# Patient Record
Sex: FEMALE | Race: WHITE | NOT HISPANIC OR LATINO | Employment: OTHER | URBAN - METROPOLITAN AREA
[De-identification: names, ages, dates, MRNs, and addresses within clinical notes are randomized per-mention and may not be internally consistent; named-entity substitution may affect disease eponyms.]

---

## 2017-05-04 ENCOUNTER — OFFICE VISIT (OUTPATIENT)
Dept: OBSTETRICS AND GYNECOLOGY | Facility: CLINIC | Age: 42
End: 2017-05-04

## 2017-05-04 ENCOUNTER — PROCEDURE VISIT (OUTPATIENT)
Dept: OBSTETRICS AND GYNECOLOGY | Facility: CLINIC | Age: 42
End: 2017-05-04

## 2017-05-04 VITALS
BODY MASS INDEX: 39.69 KG/M2 | SYSTOLIC BLOOD PRESSURE: 122 MMHG | DIASTOLIC BLOOD PRESSURE: 80 MMHG | WEIGHT: 268 LBS | HEIGHT: 69 IN

## 2017-05-04 DIAGNOSIS — Z13.29 SCREENING FOR THYROID DISORDER: ICD-10-CM

## 2017-05-04 DIAGNOSIS — Z30.431 IUD CHECK UP: Primary | ICD-10-CM

## 2017-05-04 DIAGNOSIS — R63.5 WEIGHT GAIN: ICD-10-CM

## 2017-05-04 DIAGNOSIS — Z12.31 ENCOUNTER FOR SCREENING MAMMOGRAM FOR MALIGNANT NEOPLASM OF BREAST: ICD-10-CM

## 2017-05-04 DIAGNOSIS — Z01.419 GYNECOLOGIC EXAM NORMAL: Primary | ICD-10-CM

## 2017-05-04 DIAGNOSIS — Q51.3 BICORNUATE UTERUS: ICD-10-CM

## 2017-05-04 DIAGNOSIS — N92.1 MENOMETRORRHAGIA: ICD-10-CM

## 2017-05-04 PROBLEM — M79.89 LEG SWELLING: Status: ACTIVE | Noted: 2017-05-04

## 2017-05-04 PROBLEM — G40.909 SEIZURE DISORDER: Status: ACTIVE | Noted: 2017-05-04

## 2017-05-04 LAB
BASOPHILS # BLD AUTO: 0.03 10*3/MM3 (ref 0–0.2)
BASOPHILS NFR BLD AUTO: 0.5 % (ref 0–2)
BILIRUB BLD-MCNC: NEGATIVE MG/DL
CLARITY, POC: CLEAR
COLOR UR: YELLOW
EOSINOPHIL # BLD AUTO: 0.09 10*3/MM3 (ref 0.1–0.3)
EOSINOPHIL NFR BLD AUTO: 1.6 % (ref 0–4)
ERYTHROCYTE [DISTWIDTH] IN BLOOD BY AUTOMATED COUNT: 13.5 % (ref 11.5–14.5)
GLUCOSE UR STRIP-MCNC: NEGATIVE MG/DL
HCT VFR BLD AUTO: 43.1 % (ref 37–47)
HGB BLD-MCNC: 14.8 G/DL (ref 12–16)
IMM GRANULOCYTES # BLD: 0.01 10*3/MM3 (ref 0–0.03)
IMM GRANULOCYTES NFR BLD: 0.2 % (ref 0–0.5)
KETONES UR QL: NEGATIVE
LEUKOCYTE EST, POC: NEGATIVE
LYMPHOCYTES # BLD AUTO: 2.03 10*3/MM3 (ref 0.6–4.8)
LYMPHOCYTES NFR BLD AUTO: 36.6 % (ref 20–45)
MCH RBC QN AUTO: 30.2 PG (ref 27–31)
MCHC RBC AUTO-ENTMCNC: 34.3 G/DL (ref 31–37)
MCV RBC AUTO: 88 FL (ref 81–99)
MONOCYTES # BLD AUTO: 0.29 10*3/MM3 (ref 0–1)
MONOCYTES NFR BLD AUTO: 5.2 % (ref 3–8)
NEUTROPHILS # BLD AUTO: 3.09 10*3/MM3 (ref 1.5–8.3)
NEUTROPHILS NFR BLD AUTO: 55.9 % (ref 45–70)
NITRITE UR-MCNC: NEGATIVE MG/ML
NRBC BLD AUTO-RTO: 0 /100 WBC (ref 0–0)
PH UR: 5 [PH] (ref 5–8)
PLATELET # BLD AUTO: 273 10*3/MM3 (ref 140–500)
PROT UR STRIP-MCNC: NEGATIVE MG/DL
RBC # BLD AUTO: 4.9 10*6/MM3 (ref 4.2–5.4)
RBC # UR STRIP: NEGATIVE /UL
SP GR UR: 1.03 (ref 1–1.03)
TSH SERPL DL<=0.005 MIU/L-ACNC: 1.3 MIU/ML (ref 0.27–4.2)
UROBILINOGEN UR QL: NORMAL
WBC # BLD AUTO: 5.54 10*3/MM3 (ref 4.8–10.8)

## 2017-05-04 PROCEDURE — G0101 CA SCREEN;PELVIC/BREAST EXAM: HCPCS | Performed by: OBSTETRICS & GYNECOLOGY

## 2017-05-04 PROCEDURE — 76830 TRANSVAGINAL US NON-OB: CPT | Performed by: OBSTETRICS & GYNECOLOGY

## 2017-05-04 PROCEDURE — 81002 URINALYSIS NONAUTO W/O SCOPE: CPT | Performed by: OBSTETRICS & GYNECOLOGY

## 2017-05-04 RX ORDER — POTASSIUM CHLORIDE 750 MG/1
10 TABLET, FILM COATED, EXTENDED RELEASE ORAL 2 TIMES DAILY
COMMUNITY

## 2017-05-04 RX ORDER — SPIRONOLACTONE 25 MG/1
25 TABLET ORAL DAILY
COMMUNITY
End: 2019-03-14

## 2017-05-04 RX ORDER — PHENYTOIN SODIUM 100 MG/1
400 CAPSULE, EXTENDED RELEASE ORAL 2 TIMES DAILY
COMMUNITY

## 2017-05-04 RX ORDER — LORATADINE 10 MG/1
1 CAPSULE, LIQUID FILLED ORAL DAILY
COMMUNITY
End: 2019-12-20

## 2017-05-04 RX ORDER — FUROSEMIDE 40 MG/1
120 TABLET ORAL DAILY
COMMUNITY

## 2017-05-08 PROBLEM — Q51.3 BICORNUATE UTERUS: Status: ACTIVE | Noted: 2017-05-08

## 2017-05-08 PROBLEM — Z72.0 TOBACCO USER: Status: ACTIVE | Noted: 2017-05-08

## 2017-05-08 PROBLEM — R63.8 INCREASED BMI: Status: ACTIVE | Noted: 2017-05-08

## 2017-05-08 LAB
CYTOLOGIST CVX/VAG CYTO: NORMAL
CYTOLOGY CVX/VAG DOC THIN PREP: NORMAL
DX ICD CODE: NORMAL
HIV 1 & 2 AB SER-IMP: NORMAL
HPV I/H RISK 1 DNA CVX QL PROBE+SIG AMP: NEGATIVE
OTHER STN SPEC: NORMAL
PATH REPORT.FINAL DX SPEC: NORMAL
STAT OF ADQ CVX/VAG CYTO-IMP: NORMAL

## 2017-05-11 ENCOUNTER — TRANSCRIBE ORDERS (OUTPATIENT)
Dept: OBSTETRICS AND GYNECOLOGY | Facility: CLINIC | Age: 42
End: 2017-05-11

## 2017-05-11 DIAGNOSIS — Z12.31 ENCOUNTER FOR MAMMOGRAM TO ESTABLISH BASELINE MAMMOGRAM: ICD-10-CM

## 2017-05-11 DIAGNOSIS — Z13.9 SCREENING: Primary | ICD-10-CM

## 2017-05-18 ENCOUNTER — HOSPITAL ENCOUNTER (OUTPATIENT)
Dept: MAMMOGRAPHY | Facility: HOSPITAL | Age: 42
Discharge: HOME | End: 2017-05-18
Attending: OBSTETRICS & GYNECOLOGY | Admitting: OBSTETRICS & GYNECOLOGY

## 2017-05-18 ENCOUNTER — APPOINTMENT (OUTPATIENT)
Dept: MAMMOGRAPHY | Facility: HOSPITAL | Age: 42
End: 2017-05-18
Attending: OBSTETRICS & GYNECOLOGY

## 2017-05-18 DIAGNOSIS — Z12.31 ENCOUNTER FOR MAMMOGRAM TO ESTABLISH BASELINE MAMMOGRAM: ICD-10-CM

## 2017-05-18 DIAGNOSIS — Z13.9 SCREENING: ICD-10-CM

## 2017-05-18 PROCEDURE — G0202 SCR MAMMO BI INCL CAD: HCPCS

## 2017-05-18 PROCEDURE — 77063 BREAST TOMOSYNTHESIS BI: CPT

## 2017-08-03 ENCOUNTER — OFFICE VISIT (OUTPATIENT)
Dept: OBSTETRICS AND GYNECOLOGY | Facility: CLINIC | Age: 42
End: 2017-08-03

## 2017-08-03 VITALS
WEIGHT: 251 LBS | SYSTOLIC BLOOD PRESSURE: 122 MMHG | HEIGHT: 69 IN | DIASTOLIC BLOOD PRESSURE: 70 MMHG | BODY MASS INDEX: 37.18 KG/M2

## 2017-08-03 DIAGNOSIS — N92.1 MENOMETRORRHAGIA: Primary | ICD-10-CM

## 2017-08-03 DIAGNOSIS — Q51.3 BICORNUATE UTERUS: ICD-10-CM

## 2017-08-03 PROCEDURE — 99213 OFFICE O/P EST LOW 20 MIN: CPT | Performed by: OBSTETRICS & GYNECOLOGY

## 2017-08-03 RX ORDER — FLUTICASONE PROPIONATE 50 MCG
SPRAY, SUSPENSION (ML) NASAL
Refills: 0 | COMMUNITY
Start: 2017-07-14 | End: 2018-01-04

## 2017-08-03 RX ORDER — ALBUTEROL SULFATE 2.5 MG/3ML
SOLUTION RESPIRATORY (INHALATION)
Refills: 0 | COMMUNITY
Start: 2017-07-14 | End: 2018-01-04

## 2017-08-03 RX ORDER — ACETAMINOPHEN AND CODEINE PHOSPHATE 120; 12 MG/5ML; MG/5ML
1 SOLUTION ORAL DAILY
Qty: 28 TABLET | Refills: 3 | Status: SHIPPED | OUTPATIENT
Start: 2017-08-03 | End: 2017-11-15 | Stop reason: SDUPTHER

## 2017-08-03 RX ORDER — CETIRIZINE HYDROCHLORIDE 10 MG/1
TABLET ORAL
Refills: 0 | COMMUNITY
Start: 2017-07-14 | End: 2018-01-04

## 2017-08-03 RX ORDER — MONTELUKAST SODIUM 10 MG/1
TABLET ORAL
Refills: 0 | COMMUNITY
Start: 2017-07-14 | End: 2018-01-04

## 2017-08-03 NOTE — PROGRESS NOTES
"FU VISIT    Chief Complaint: menometrorrhagia      Kayli Calles is a 42 y.o. patient who presents complaining of weight loss and depressive symptoms since her mother  1 week ago. Has a Mirena IUD in place. Had a cycle in May, 3 cycles in , 2 cycles in July. Pt has a bicornuate uterus. This is a fu appt for menometrorrhagia. Pt had EMBx and placement of IUD to control symptoms but due to her bicornuate uterus, she has had continued with menometrorrhagia.  Chief Complaint   Patient presents with   • Follow-up             The following portions of the patient's history were reviewed and updated as appropriate: allergies, current medications and problem list.    Review of Systems   Genitourinary: Positive for menstrual problem. Negative for pelvic pain and vaginal bleeding.       /70  Ht 69\" (175.3 cm)  Wt 251 lb (114 kg)  LMP 07/10/2017  Breastfeeding? No  BMI 37.07 kg/m2    Physical Exam   Constitutional: She is oriented to person, place, and time. She appears well-developed and well-nourished. No distress.   Pt crying about the death of her mother   Neurological: She is alert and oriented to person, place, and time.   Skin: She is not diaphoretic.   Psychiatric: She has a normal mood and affect. Her behavior is normal. Judgment and thought content normal.   Vitals reviewed.        Assessment/Plan   Kayli was seen today for follow-up.    Diagnoses and all orders for this visit:    Menometrorrhagia    Bicornuate uterus    Other orders  -     norethindrone (MICRONOR) 0.35 MG tablet; Take 1 tablet by mouth Daily.    43yo with menometrorrhagia    1) Menometrorrhagia: Pt had a  Mirena IUD placed to control this issue. The IUD is due to come out in 2019.  Pt cannot take combined OCPs due to her tobacco smoking so only other options are Micronor or Nexplanon or hysterectomy. Pt has continued to have irregular heavy bleeding since April. The bleeding is most likely due to her bicornuate uterus with " IUD in the left horn. Pt's mother  last week and pt is mourning her mother's loss. Discussed starting Micronor this  before considering more long term options. Pt to fu in 3 months to evaluate symptom.  3) Bicornuate uterus: Mirena IUD in the left horn  4) Contraception: Pt is not sexually active. She is aware that the IUD is not a good contraception option since she has a bicornuate uterus.  5) Smoking Status: .5ppd  6) Seizure disorder  7) Social: Pt has always lived her mother. Her mother  last week unexpectedly. Pt is now living with her sister.             Return in about 3 months (around 11/3/2017).      Carmelita Palma,     8/3/2017  9:17 PM

## 2017-10-27 ENCOUNTER — TRANSCRIBE ORDERS (OUTPATIENT)
Dept: ADMINISTRATIVE | Facility: HOSPITAL | Age: 42
End: 2017-10-27

## 2017-10-27 DIAGNOSIS — E04.2 NONTOXIC MULTINODULAR GOITER: Primary | ICD-10-CM

## 2017-10-31 ENCOUNTER — TRANSCRIBE ORDERS (OUTPATIENT)
Dept: ADMINISTRATIVE | Facility: HOSPITAL | Age: 42
End: 2017-10-31

## 2017-11-02 ENCOUNTER — OFFICE VISIT (OUTPATIENT)
Dept: OBSTETRICS AND GYNECOLOGY | Facility: CLINIC | Age: 42
End: 2017-11-02

## 2017-11-02 ENCOUNTER — PREP FOR SURGERY (OUTPATIENT)
Dept: OTHER | Facility: HOSPITAL | Age: 42
End: 2017-11-02

## 2017-11-02 VITALS
BODY MASS INDEX: 37.74 KG/M2 | WEIGHT: 254.8 LBS | HEIGHT: 69 IN | SYSTOLIC BLOOD PRESSURE: 118 MMHG | DIASTOLIC BLOOD PRESSURE: 72 MMHG

## 2017-11-02 DIAGNOSIS — N92.1 MENOMETRORRHAGIA: Primary | ICD-10-CM

## 2017-11-02 DIAGNOSIS — Z13.9 SCREENING FOR CONDITION: ICD-10-CM

## 2017-11-02 DIAGNOSIS — Q51.3 BICORNUATE UTERUS: ICD-10-CM

## 2017-11-02 LAB
BILIRUB BLD-MCNC: NEGATIVE MG/DL
CLARITY, POC: CLEAR
COLOR UR: YELLOW
GLUCOSE UR STRIP-MCNC: NEGATIVE MG/DL
KETONES UR QL: NEGATIVE
LEUKOCYTE EST, POC: NEGATIVE
NITRITE UR-MCNC: NEGATIVE MG/ML
PH UR: 5 [PH] (ref 5–8)
PROT UR STRIP-MCNC: NEGATIVE MG/DL
RBC # UR STRIP: ABNORMAL /UL
SP GR UR: 1 (ref 1–1.03)
UROBILINOGEN UR QL: NORMAL

## 2017-11-02 PROCEDURE — 99214 OFFICE O/P EST MOD 30 MIN: CPT | Performed by: OBSTETRICS & GYNECOLOGY

## 2017-11-02 RX ORDER — ERGOCALCIFEROL 1.25 MG/1
50000 CAPSULE ORAL
Refills: 0 | COMMUNITY
Start: 2017-10-16

## 2017-11-02 RX ORDER — SODIUM CHLORIDE 9 MG/ML
40 INJECTION, SOLUTION INTRAVENOUS AS NEEDED
Status: CANCELLED | OUTPATIENT
Start: 2017-11-02

## 2017-11-02 RX ORDER — BUSPIRONE HYDROCHLORIDE 7.5 MG/1
TABLET ORAL
Refills: 0 | COMMUNITY
Start: 2017-08-08 | End: 2018-01-04

## 2017-11-02 RX ORDER — SODIUM CHLORIDE 0.9 % (FLUSH) 0.9 %
1-10 SYRINGE (ML) INJECTION AS NEEDED
Status: CANCELLED | OUTPATIENT
Start: 2017-11-02

## 2017-11-02 NOTE — PROGRESS NOTES
"PROBLEM VISIT    Chief Complaint: menometrorrhagia      Kayli Calles is a 42 y.o. patient who presents for follow up of menometrorrhagia. Pt has had a long standing issue with this problem. Pt has a bicornuate uterus. She was placed on Micronor and has not had any improvement with her bleeding. Getting her cycle 2-3 times per month since starting. Pt has a Mirena IUD in one of her horns. IUD placed prior to dx of bicornuate uterus. Endometrial ablation was also attempted without success prior to diagnosis.  Chief Complaint   Patient presents with   • Follow-up             The following portions of the patient's history were reviewed and updated as appropriate: allergies, current medications and problem list.    Review of Systems   Genitourinary: Positive for menstrual problem and vaginal bleeding. Negative for pelvic pain.       /72  Ht 69\" (175.3 cm)  Wt 254 lb 12.8 oz (116 kg)  LMP 11/01/2017 (Exact Date)  Breastfeeding? No  BMI 37.63 kg/m2    Physical Exam   Constitutional: She is oriented to person, place, and time. She appears well-developed and well-nourished. No distress.   Abdominal: Soft. She exhibits no distension. There is no tenderness.   Neurological: She is alert and oriented to person, place, and time.   Skin: She is not diaphoretic.   Psychiatric: She has a normal mood and affect. Her behavior is normal. Judgment and thought content normal. Cognition and memory are normal.   Vitals reviewed.        Assessment/Plan   Kayli was seen today for follow-up.    Diagnoses and all orders for this visit:    Menometrorrhagia    Screening for condition  -     POC Urinalysis Dipstick    Bicornuate uterus    41yo with bicornuate uterus and menometrorrhagia    1) Gyn HM: pap normal 5/2017    2) Contraception: Pt has never had sex    3) Family Planning: No plans for children    4) Menometrorrhagia: Pt has a hx of normal TSH and CBC in 5/2017. Pt has a history of a normal endometrial biopsy prior to " attempting an endometrial ablation in 1/2014. An IUD was ultimately placed and the pt did well until this year. A repeat US in 5/2017 diagnosed the bicornuate uterus with the IUD in the left horn. Pt is a smoker and not a candidate for combined OCPs. A trial of Micronor was attempted for 3 months but did not help her symptoms. Pt is ready to proceed with a TLH. Procedure discussed at length including pre/intra/post procedure. Risks reviewed including bleeding, infection, damage to bowel, bladder, ureters and risk of DVT. Will proceed with scheduling.    5) Tobacco smoker.              No Follow-up on file.      Carmelita Palma,     11/6/2017  8:39 PM

## 2017-11-19 RX ORDER — NORETHINDRONE 0.35 MG
KIT ORAL
Qty: 28 TABLET | Refills: 3 | Status: SHIPPED | OUTPATIENT
Start: 2017-11-19 | End: 2018-01-04

## 2018-01-03 ENCOUNTER — TELEPHONE (OUTPATIENT)
Dept: OBSTETRICS AND GYNECOLOGY | Facility: CLINIC | Age: 43
End: 2018-01-03

## 2018-01-03 NOTE — TELEPHONE ENCOUNTER
Consent order left out the word hysterectomy, per pat . Need to correct , her surgery is 01/17/18. thanks

## 2018-01-04 ENCOUNTER — APPOINTMENT (OUTPATIENT)
Dept: PREADMISSION TESTING | Facility: HOSPITAL | Age: 43
End: 2018-01-04

## 2018-01-04 VITALS
BODY MASS INDEX: 38.69 KG/M2 | HEART RATE: 76 BPM | RESPIRATION RATE: 16 BRPM | DIASTOLIC BLOOD PRESSURE: 64 MMHG | HEIGHT: 69 IN | OXYGEN SATURATION: 100 % | WEIGHT: 261.2 LBS | SYSTOLIC BLOOD PRESSURE: 112 MMHG

## 2018-01-04 DIAGNOSIS — N92.1 MENOMETRORRHAGIA: Primary | ICD-10-CM

## 2018-01-04 DIAGNOSIS — G40.909 SEIZURE DISORDER (HCC): ICD-10-CM

## 2018-01-04 DIAGNOSIS — M79.89 LEG SWELLING: ICD-10-CM

## 2018-01-04 DIAGNOSIS — Q51.3 BICORNUATE UTERUS: ICD-10-CM

## 2018-01-04 LAB
ABO GROUP BLD: NORMAL
ABO GROUP BLD: NORMAL
ANION GAP SERPL CALCULATED.3IONS-SCNC: 12.4 MMOL/L
BASOPHILS # BLD AUTO: 0.02 10*3/MM3 (ref 0–0.2)
BASOPHILS NFR BLD AUTO: 0.3 % (ref 0–2)
BLD GP AB SCN SERPL QL: NEGATIVE
BUN BLD-MCNC: 21 MG/DL (ref 6–20)
BUN/CREAT SERPL: 21 (ref 7–25)
CALCIUM SPEC-SCNC: 8.4 MG/DL (ref 8.6–10.5)
CHLORIDE SERPL-SCNC: 99 MMOL/L (ref 98–107)
CO2 SERPL-SCNC: 27.6 MMOL/L (ref 22–29)
CREAT BLD-MCNC: 1 MG/DL (ref 0.57–1)
DEPRECATED RDW RBC AUTO: 47.5 FL (ref 37–54)
EOSINOPHIL # BLD AUTO: 0.09 10*3/MM3 (ref 0.1–0.3)
EOSINOPHIL NFR BLD AUTO: 1.2 % (ref 0–4)
ERYTHROCYTE [DISTWIDTH] IN BLOOD BY AUTOMATED COUNT: 14.2 % (ref 11.5–14.5)
GFR SERPL CREATININE-BSD FRML MDRD: 60 ML/MIN/1.73
GLUCOSE BLD-MCNC: 125 MG/DL (ref 65–99)
HCT VFR BLD AUTO: 39.4 % (ref 37–47)
HGB BLD-MCNC: 13.6 G/DL (ref 12–16)
IMM GRANULOCYTES # BLD: 0.02 10*3/MM3 (ref 0–0.03)
IMM GRANULOCYTES NFR BLD: 0.3 % (ref 0–0.5)
LYMPHOCYTES # BLD AUTO: 2.17 10*3/MM3 (ref 0.6–4.8)
LYMPHOCYTES NFR BLD AUTO: 29.7 % (ref 20–45)
MCH RBC QN AUTO: 31.3 PG (ref 27–31)
MCHC RBC AUTO-ENTMCNC: 34.5 G/DL (ref 31–37)
MCV RBC AUTO: 90.8 FL (ref 81–99)
MONOCYTES # BLD AUTO: 0.26 10*3/MM3 (ref 0–1)
MONOCYTES NFR BLD AUTO: 3.6 % (ref 3–8)
NEUTROPHILS # BLD AUTO: 4.74 10*3/MM3 (ref 1.5–8.3)
NEUTROPHILS NFR BLD AUTO: 64.9 % (ref 45–70)
NRBC BLD MANUAL-RTO: 0 /100 WBC (ref 0–0)
PLATELET # BLD AUTO: 238 10*3/MM3 (ref 140–500)
PMV BLD AUTO: 10.6 FL (ref 7.4–10.4)
POTASSIUM BLD-SCNC: 3.7 MMOL/L (ref 3.5–5.2)
RBC # BLD AUTO: 4.34 10*6/MM3 (ref 4.2–5.4)
RH BLD: NEGATIVE
RH BLD: NEGATIVE
SODIUM BLD-SCNC: 139 MMOL/L (ref 136–145)
WBC NRBC COR # BLD: 7.3 10*3/MM3 (ref 4.8–10.8)

## 2018-01-04 PROCEDURE — 86901 BLOOD TYPING SEROLOGIC RH(D): CPT | Performed by: OBSTETRICS & GYNECOLOGY

## 2018-01-04 PROCEDURE — 93005 ELECTROCARDIOGRAM TRACING: CPT

## 2018-01-04 PROCEDURE — 86900 BLOOD TYPING SEROLOGIC ABO: CPT | Performed by: OBSTETRICS & GYNECOLOGY

## 2018-01-04 PROCEDURE — 80048 BASIC METABOLIC PNL TOTAL CA: CPT | Performed by: OBSTETRICS & GYNECOLOGY

## 2018-01-04 PROCEDURE — 86850 RBC ANTIBODY SCREEN: CPT | Performed by: OBSTETRICS & GYNECOLOGY

## 2018-01-04 PROCEDURE — 86901 BLOOD TYPING SEROLOGIC RH(D): CPT

## 2018-01-04 PROCEDURE — 36415 COLL VENOUS BLD VENIPUNCTURE: CPT

## 2018-01-04 PROCEDURE — 85025 COMPLETE CBC W/AUTO DIFF WBC: CPT | Performed by: OBSTETRICS & GYNECOLOGY

## 2018-01-04 PROCEDURE — 86900 BLOOD TYPING SEROLOGIC ABO: CPT

## 2018-01-04 PROCEDURE — 93010 ELECTROCARDIOGRAM REPORT: CPT | Performed by: INTERNAL MEDICINE

## 2018-01-04 RX ORDER — FLUTICASONE PROPIONATE 50 MCG
2 SPRAY, SUSPENSION (ML) NASAL DAILY
COMMUNITY

## 2018-01-04 RX ORDER — ALBUTEROL SULFATE 2.5 MG/3ML
2.5 SOLUTION RESPIRATORY (INHALATION) EVERY 4 HOURS PRN
COMMUNITY
End: 2019-03-14

## 2018-01-04 RX ORDER — CETIRIZINE HYDROCHLORIDE 10 MG/1
10 TABLET ORAL DAILY
COMMUNITY

## 2018-01-04 RX ORDER — BUSPIRONE HYDROCHLORIDE 7.5 MG/1
7.5 TABLET ORAL 2 TIMES DAILY
COMMUNITY
End: 2019-12-20

## 2018-01-04 RX ORDER — MONTELUKAST SODIUM 10 MG/1
10 TABLET ORAL DAILY
COMMUNITY

## 2018-01-04 NOTE — DISCHARGE INSTRUCTIONS
PRE-ADMISSION TESTING INSTRUCTIONS FOR ADULTS    No aspirin, advil, aleve, ibuprofen, naproxen, diet pills, decongestants, or herbal/vitamins for a week prior to surgery.    Use your albuterol nebulizer the morning of surgery.  Take your buspar, singulair, and Dilantin the morning of surgery.    General Instructions:    • Do not eat solid food after midnight the night before surgery.  No gum, mints, or hard candy after midnight the night before surgery.  • You may drink clear liquids the day of surgery up until 2 hours before your arrival time.  (Until 5:30 am)  • Clear liquids are liquids you can see through. Nothing RED in color.    Plain water    Sports drinks  Sodas     Gelatin (Jell-O)  Fruit juices without pulp such as white grape juice and apple juice  Popsicles that contain no fruit or yogurt  Tea or coffee (no cream or milk added)    • It is beneficial for you to have a clear drink that contains carbohydrates just before you leave your house and before your fasting time begins.  We suggest a 20 ounce bottle of Gatorade or Powerade for non-diabetic patients or a 20 ounce bottle of G2 or Powerade Zero for diabetic patients.     • Patients who avoid smoking, chewing tobacco and alcohol for 4 weeks prior to surgery have a reduced risk of post-operative complications.  • Do not smoke, use chewing tobacco or drink alcohol the day of surgery    • Bring your C-PAP/ BI-PAP machine if you use one.  • Wear clean comfortable clothes and socks.  • Do not wear contact lenses, lotion, deodorant, or make-up.  Bring a case for your glasses if applicable.   • Bring crutches or walker if applicable.  • Leave all other valuables and jewelry at home.      Preventing a Surgical Site Infection:    • Shower the night before and on the morning of surgery using the chlorhexidine soap you were given.  Use a clean washcloth with the soap.  Place clean sheets on your bed after showering the night before surgery. Do not use the CHG soap  on your hair, face, or private areas. Wash your body gently for five (5) minutes. Do not scrub your skin too hard.  Dry with a clean towel and dress in clean clothing.    • Do not shave the surgical area for 10 days-2 weeks prior to surgery  because the razor can irritate skin and make it easier to develop an infection.    • Make sure you, your family, and all healthcare providers clean their hands with soap and water or an alcohol based hand  before caring for you or your wound.    • If at all possible, quit smoking as many days before surgery as you can.    Day of surgery:    Your surgeon’s office will advise you of your arrival time for the day of surgery.    Upon arrival, a Pre-op nurse and Anesthesia provider will review your health history, obtain vital signs, and answer questions you may have.  The only belongings needed at this time will be your home medications and if applicable your C-PAP/BI-PAP machine.  If you are staying overnight your family can leave the rest of your belongings in the car and bring them to your room later.  A Pre-op nurse will start an IV and you may receive medication in preparation for surgery, including something to help you relax.  Your family will be able to see you in the Pre-op area.  While you are in surgery your family should notify the waiting room  if they leave the waiting room area and provide a contact phone number.    IF you have any questions, you can call the Pre-Admission Department at [phone] or your surgeon's office.    Please be aware that surgery does come with discomfort.  We want to make every effort to control your discomfort so please discuss any uncontrolled symptoms with your nurse.   Your doctor will most likely have prescribed pain medications.      If you are going home after surgery, you will receive individualized written care instructions before being discharged.  A responsible adult (over the age of 18) must drive you to  and from the hospital on the day of your surgery and stay with you for 24 hours after anesthesia.    If you are staying overnight following surgery, you will be transported to your hospital room following the recovery period.  [facility] has all private rooms.    Deductibles and co-payments are collected on the day of service. Please be prepared to pay the required co-pay, deductible or deposit on the day of service as defined by your plan.    Total Laparoscopic Hysterectomy  A total laparoscopic hysterectomy is a minimally invasive surgery to remove your uterus and cervix. This surgery is performed by making several small cuts (incisions) in your abdomen. It can also be done with a thin, lighted tube (laparoscope) inserted into two small incisions in your lower abdomen. Your fallopian tubes and ovaries can be removed (bilateral salpingo-oophorectomy) during this surgery as well. Benefits of minimally invasive surgery include:  · Less pain.  · Less risk of blood loss.  · Less risk of infection.  · Quicker return to normal activities.  LET YOUR HEALTH CARE PROVIDER KNOW ABOUT:  · Any allergies you have.  · All medicines you are taking, including vitamins, herbs, eye drops, creams, and over-the-counter medicines.  · Previous problems you or members of your family have had with the use of anesthetics.  · Any blood disorders you have.  · Previous surgeries you have had.  · Medical conditions you have.  RISKS AND COMPLICATIONS   Generally, this is a safe procedure. However, as with any procedure, complications can occur. Possible complications include:  · Bleeding.  · Blood clots in the legs or lung.  · Infection.  · Injury to surrounding organs.  · Problems with anesthesia.  · Early menopause symptoms (hot flashes, night sweats, insomnia).  · Risk of conversion to an open abdominal incision.  BEFORE THE PROCEDURE  · Ask your health care provider about changing or stopping your regular medicines.  · Do not  take aspirin or blood thinners (anticoagulants) for 1 week before the surgery or as told by your health care provider.  · Do not eat or drink anything for 8 hours before the surgery or as told by your health care provider.  · Quit smoking if you smoke.  · Arrange for a ride home after surgery and for someone to help you at home during recovery.  PROCEDURE   · You will be given antibiotic medicine.  · An IV tube will be placed in your arm. You will be given medicine to make you sleep (general anesthetic).  · A villa catheter will be placed to drain your bladder during surgery. This is usually removed within 24 hours.  · A gas (carbon dioxide) will be used to inflate your abdomen. This will allow your surgeon to look inside your abdomen, perform your surgery, and treat any other problems found if necessary.  · Three or four small incisions (often less than 1/2 inch) will be made in your abdomen. One of these incisions will be made in the area of your belly button (navel). The laparoscope will be inserted into the incision. Your surgeon will look through the laparoscope while doing your procedure.  · Other surgical instruments will be inserted through the other incisions.  · Your uterus may be removed through your vagina or cut into small pieces and removed through the small incisions.  · Your incisions will be closed.  AFTER THE PROCEDURE  · The gas will be released from inside your abdomen.  · You will be taken to the recovery area where a nurse will watch and check your progress. Once you are awake, stable, and taking fluids well, without other problems, you will return to your room or be allowed to go home.  · There is usually minimal discomfort following the surgery because the incisions are so small.  · You will be given pain medicine while you are in the hospital and for when you go home.     This information is not intended to replace advice given to you by your health care provider. Make sure you discuss any  questions you have with your health care provider.     Document Released: 10/14/2008 Document Revised: 08/20/2014 Document Reviewed: 07/08/2014  [employer]® Patient Information ©2016 [employer].

## 2018-01-04 NOTE — PAT
Pt here for PAT visit.  Pre-op tests completed, chg soap given, and instructions reviewed.  Instructed clears until 5:30 am dos, voiced understanding.  Will need accu-check, HCG, and potassium dos.

## 2018-01-16 ENCOUNTER — ANESTHESIA EVENT (OUTPATIENT)
Dept: PERIOP | Facility: HOSPITAL | Age: 43
End: 2018-01-16

## 2018-01-17 ENCOUNTER — HOSPITAL ENCOUNTER (OUTPATIENT)
Facility: HOSPITAL | Age: 43
Discharge: HOME | End: 2018-01-18
Attending: OBSTETRICS & GYNECOLOGY | Admitting: OBSTETRICS & GYNECOLOGY

## 2018-01-17 ENCOUNTER — ANESTHESIA (OUTPATIENT)
Dept: PERIOP | Facility: HOSPITAL | Age: 43
End: 2018-01-17

## 2018-01-17 DIAGNOSIS — Z90.710 S/P HYSTERECTOMY: Primary | ICD-10-CM

## 2018-01-17 DIAGNOSIS — N92.1 MENOMETRORRHAGIA: ICD-10-CM

## 2018-01-17 LAB
ANION GAP SERPL CALCULATED.3IONS-SCNC: 8.5 MMOL/L
BUN BLD-MCNC: 18 MG/DL (ref 6–20)
BUN/CREAT SERPL: 18.4 (ref 7–25)
CALCIUM SPEC-SCNC: 8.4 MG/DL (ref 8.6–10.5)
CHLORIDE SERPL-SCNC: 100 MMOL/L (ref 98–107)
CO2 SERPL-SCNC: 28.5 MMOL/L (ref 22–29)
CREAT BLD-MCNC: 0.98 MG/DL (ref 0.57–1)
GFR SERPL CREATININE-BSD FRML MDRD: 62 ML/MIN/1.73
GLUCOSE BLD-MCNC: 87 MG/DL (ref 65–99)
HCG SERPL QL: NEGATIVE
POTASSIUM BLD-SCNC: 3.9 MMOL/L (ref 3.5–5.2)
SODIUM BLD-SCNC: 137 MMOL/L (ref 136–145)

## 2018-01-17 PROCEDURE — 94640 AIRWAY INHALATION TREATMENT: CPT

## 2018-01-17 PROCEDURE — 25010000002 MIDAZOLAM PER 1 MG: Performed by: ANESTHESIOLOGY

## 2018-01-17 PROCEDURE — 84703 CHORIONIC GONADOTROPIN ASSAY: CPT | Performed by: ANESTHESIOLOGY

## 2018-01-17 PROCEDURE — S0260 H&P FOR SURGERY: HCPCS | Performed by: OBSTETRICS & GYNECOLOGY

## 2018-01-17 PROCEDURE — 25010000002 HYDROMORPHONE PER 4 MG: Performed by: NURSE ANESTHETIST, CERTIFIED REGISTERED

## 2018-01-17 PROCEDURE — 25010000002 PROPOFOL 10 MG/ML EMULSION: Performed by: NURSE ANESTHETIST, CERTIFIED REGISTERED

## 2018-01-17 PROCEDURE — 58301 REMOVE INTRAUTERINE DEVICE: CPT | Performed by: OBSTETRICS & GYNECOLOGY

## 2018-01-17 PROCEDURE — 25010000002 FENTANYL CITRATE (PF) 100 MCG/2ML SOLUTION: Performed by: NURSE ANESTHETIST, CERTIFIED REGISTERED

## 2018-01-17 PROCEDURE — 25010000002 ONDANSETRON PER 1 MG: Performed by: ANESTHESIOLOGY

## 2018-01-17 PROCEDURE — 25010000002 SUCCINYLCHOLINE PER 20 MG: Performed by: NURSE ANESTHETIST, CERTIFIED REGISTERED

## 2018-01-17 PROCEDURE — 94799 UNLISTED PULMONARY SVC/PX: CPT

## 2018-01-17 PROCEDURE — 25010000002 KETOROLAC TROMETHAMINE PER 15 MG: Performed by: NURSE ANESTHETIST, CERTIFIED REGISTERED

## 2018-01-17 PROCEDURE — 25010000003 CEFAZOLIN PER 500 MG: Performed by: OBSTETRICS & GYNECOLOGY

## 2018-01-17 PROCEDURE — G0378 HOSPITAL OBSERVATION PER HR: HCPCS

## 2018-01-17 PROCEDURE — 25010000002 NEOSTIGMINE PER 0.5 MG: Performed by: NURSE ANESTHETIST, CERTIFIED REGISTERED

## 2018-01-17 PROCEDURE — 80048 BASIC METABOLIC PNL TOTAL CA: CPT | Performed by: ANESTHESIOLOGY

## 2018-01-17 PROCEDURE — 25010000003 HYDROMORPHONE PER 4 MG

## 2018-01-17 PROCEDURE — 25010000002 DEXAMETHASONE PER 1 MG: Performed by: ANESTHESIOLOGY

## 2018-01-17 PROCEDURE — 58571 TLH W/T/O 250 G OR LESS: CPT | Performed by: OBSTETRICS & GYNECOLOGY

## 2018-01-17 RX ORDER — ONDANSETRON 4 MG/1
4 TABLET, ORALLY DISINTEGRATING ORAL EVERY 6 HOURS PRN
Status: DISCONTINUED | OUTPATIENT
Start: 2018-01-17 | End: 2018-01-18 | Stop reason: HOSPADM

## 2018-01-17 RX ORDER — NALOXONE HCL 0.4 MG/ML
0.1 VIAL (ML) INJECTION
Status: DISCONTINUED | OUTPATIENT
Start: 2018-01-17 | End: 2018-01-18 | Stop reason: HOSPADM

## 2018-01-17 RX ORDER — ONDANSETRON 4 MG/1
4 TABLET, FILM COATED ORAL EVERY 6 HOURS PRN
Status: DISCONTINUED | OUTPATIENT
Start: 2018-01-17 | End: 2018-01-18 | Stop reason: HOSPADM

## 2018-01-17 RX ORDER — PROMETHAZINE HYDROCHLORIDE 25 MG/ML
6.25 INJECTION, SOLUTION INTRAMUSCULAR; INTRAVENOUS ONCE AS NEEDED
Status: DISCONTINUED | OUTPATIENT
Start: 2018-01-17 | End: 2018-01-17 | Stop reason: HOSPADM

## 2018-01-17 RX ORDER — SODIUM CHLORIDE 9 MG/ML
40 INJECTION, SOLUTION INTRAVENOUS AS NEEDED
Status: DISCONTINUED | OUTPATIENT
Start: 2018-01-17 | End: 2018-01-17 | Stop reason: HOSPADM

## 2018-01-17 RX ORDER — SODIUM CHLORIDE 0.9 % (FLUSH) 0.9 %
1-10 SYRINGE (ML) INJECTION AS NEEDED
Status: DISCONTINUED | OUTPATIENT
Start: 2018-01-17 | End: 2018-01-17 | Stop reason: HOSPADM

## 2018-01-17 RX ORDER — FAMOTIDINE 10 MG/ML
20 INJECTION, SOLUTION INTRAVENOUS
Status: DISCONTINUED | OUTPATIENT
Start: 2018-01-17 | End: 2018-01-17 | Stop reason: HOSPADM

## 2018-01-17 RX ORDER — POTASSIUM CHLORIDE 750 MG/1
10 TABLET, FILM COATED, EXTENDED RELEASE ORAL DAILY
Status: DISCONTINUED | OUTPATIENT
Start: 2018-01-17 | End: 2018-01-18 | Stop reason: HOSPADM

## 2018-01-17 RX ORDER — ROCURONIUM BROMIDE 10 MG/ML
INJECTION, SOLUTION INTRAVENOUS AS NEEDED
Status: DISCONTINUED | OUTPATIENT
Start: 2018-01-17 | End: 2018-01-17 | Stop reason: SURG

## 2018-01-17 RX ORDER — GLYCOPYRROLATE 0.2 MG/ML
0.2 INJECTION INTRAMUSCULAR; INTRAVENOUS
Status: DISCONTINUED | OUTPATIENT
Start: 2018-01-17 | End: 2018-01-17 | Stop reason: HOSPADM

## 2018-01-17 RX ORDER — MIDAZOLAM HYDROCHLORIDE 1 MG/ML
1 INJECTION INTRAMUSCULAR; INTRAVENOUS
Status: DISCONTINUED | OUTPATIENT
Start: 2018-01-17 | End: 2018-01-17 | Stop reason: HOSPADM

## 2018-01-17 RX ORDER — HYDROMORPHONE HCL 110MG/55ML
0.5 PATIENT CONTROLLED ANALGESIA SYRINGE INTRAVENOUS AS NEEDED
Status: DISCONTINUED | OUTPATIENT
Start: 2018-01-17 | End: 2018-01-17 | Stop reason: HOSPADM

## 2018-01-17 RX ORDER — ONDANSETRON 2 MG/ML
4 INJECTION INTRAMUSCULAR; INTRAVENOUS ONCE AS NEEDED
Status: DISCONTINUED | OUTPATIENT
Start: 2018-01-17 | End: 2018-01-17 | Stop reason: HOSPADM

## 2018-01-17 RX ORDER — LIDOCAINE HYDROCHLORIDE 20 MG/ML
INJECTION, SOLUTION INFILTRATION; PERINEURAL AS NEEDED
Status: DISCONTINUED | OUTPATIENT
Start: 2018-01-17 | End: 2018-01-17 | Stop reason: SURG

## 2018-01-17 RX ORDER — ONDANSETRON 2 MG/ML
4 INJECTION INTRAMUSCULAR; INTRAVENOUS ONCE AS NEEDED
Status: COMPLETED | OUTPATIENT
Start: 2018-01-17 | End: 2018-01-17

## 2018-01-17 RX ORDER — KETOROLAC TROMETHAMINE 30 MG/ML
INJECTION, SOLUTION INTRAMUSCULAR; INTRAVENOUS AS NEEDED
Status: DISCONTINUED | OUTPATIENT
Start: 2018-01-17 | End: 2018-01-17 | Stop reason: SURG

## 2018-01-17 RX ORDER — GLYCOPYRROLATE 0.2 MG/ML
INJECTION INTRAMUSCULAR; INTRAVENOUS AS NEEDED
Status: DISCONTINUED | OUTPATIENT
Start: 2018-01-17 | End: 2018-01-17 | Stop reason: SURG

## 2018-01-17 RX ORDER — MONTELUKAST SODIUM 10 MG/1
10 TABLET ORAL DAILY
Status: DISCONTINUED | OUTPATIENT
Start: 2018-01-17 | End: 2018-01-18 | Stop reason: HOSPADM

## 2018-01-17 RX ORDER — LIDOCAINE HYDROCHLORIDE 10 MG/ML
0.5 INJECTION, SOLUTION EPIDURAL; INFILTRATION; INTRACAUDAL; PERINEURAL ONCE AS NEEDED
Status: COMPLETED | OUTPATIENT
Start: 2018-01-17 | End: 2018-01-17

## 2018-01-17 RX ORDER — SPIRONOLACTONE 25 MG/1
25 TABLET ORAL DAILY
Status: DISCONTINUED | OUTPATIENT
Start: 2018-01-17 | End: 2018-01-18 | Stop reason: HOSPADM

## 2018-01-17 RX ORDER — KETAMINE HYDROCHLORIDE 100 MG/ML
INJECTION INTRAMUSCULAR; INTRAVENOUS AS NEEDED
Status: DISCONTINUED | OUTPATIENT
Start: 2018-01-17 | End: 2018-01-17 | Stop reason: SURG

## 2018-01-17 RX ORDER — DIPHENHYDRAMINE HYDROCHLORIDE 50 MG/ML
25 INJECTION INTRAMUSCULAR; INTRAVENOUS EVERY 6 HOURS PRN
Status: DISCONTINUED | OUTPATIENT
Start: 2018-01-17 | End: 2018-01-18 | Stop reason: HOSPADM

## 2018-01-17 RX ORDER — MIDAZOLAM HYDROCHLORIDE 1 MG/ML
2 INJECTION INTRAMUSCULAR; INTRAVENOUS
Status: DISCONTINUED | OUTPATIENT
Start: 2018-01-17 | End: 2018-01-17 | Stop reason: HOSPADM

## 2018-01-17 RX ORDER — SODIUM CHLORIDE, SODIUM LACTATE, POTASSIUM CHLORIDE, CALCIUM CHLORIDE 600; 310; 30; 20 MG/100ML; MG/100ML; MG/100ML; MG/100ML
9 INJECTION, SOLUTION INTRAVENOUS CONTINUOUS
Status: DISCONTINUED | OUTPATIENT
Start: 2018-01-17 | End: 2018-01-17

## 2018-01-17 RX ORDER — ONDANSETRON 2 MG/ML
4 INJECTION INTRAMUSCULAR; INTRAVENOUS EVERY 6 HOURS PRN
Status: DISCONTINUED | OUTPATIENT
Start: 2018-01-17 | End: 2018-01-18 | Stop reason: HOSPADM

## 2018-01-17 RX ORDER — PROPOFOL 10 MG/ML
VIAL (ML) INTRAVENOUS AS NEEDED
Status: DISCONTINUED | OUTPATIENT
Start: 2018-01-17 | End: 2018-01-17 | Stop reason: SURG

## 2018-01-17 RX ORDER — IPRATROPIUM BROMIDE AND ALBUTEROL SULFATE 2.5; .5 MG/3ML; MG/3ML
3 SOLUTION RESPIRATORY (INHALATION) ONCE
Status: COMPLETED | OUTPATIENT
Start: 2018-01-17 | End: 2018-01-17

## 2018-01-17 RX ORDER — PROMETHAZINE HYDROCHLORIDE 25 MG/1
25 SUPPOSITORY RECTAL ONCE AS NEEDED
Status: DISCONTINUED | OUTPATIENT
Start: 2018-01-17 | End: 2018-01-17 | Stop reason: HOSPADM

## 2018-01-17 RX ORDER — PROMETHAZINE HYDROCHLORIDE 25 MG/1
25 TABLET ORAL ONCE AS NEEDED
Status: DISCONTINUED | OUTPATIENT
Start: 2018-01-17 | End: 2018-01-17 | Stop reason: HOSPADM

## 2018-01-17 RX ORDER — FUROSEMIDE 40 MG/1
40 TABLET ORAL DAILY
Status: DISCONTINUED | OUTPATIENT
Start: 2018-01-17 | End: 2018-01-18 | Stop reason: HOSPADM

## 2018-01-17 RX ORDER — FLUTICASONE PROPIONATE 50 MCG
2 SPRAY, SUSPENSION (ML) NASAL DAILY
Status: DISCONTINUED | OUTPATIENT
Start: 2018-01-17 | End: 2018-01-18 | Stop reason: HOSPADM

## 2018-01-17 RX ORDER — SODIUM CHLORIDE, SODIUM LACTATE, POTASSIUM CHLORIDE, CALCIUM CHLORIDE 600; 310; 30; 20 MG/100ML; MG/100ML; MG/100ML; MG/100ML
100 INJECTION, SOLUTION INTRAVENOUS CONTINUOUS
Status: DISCONTINUED | OUTPATIENT
Start: 2018-01-17 | End: 2018-01-18

## 2018-01-17 RX ORDER — SCOLOPAMINE TRANSDERMAL SYSTEM 1 MG/1
1 PATCH, EXTENDED RELEASE TRANSDERMAL
Status: DISCONTINUED | OUTPATIENT
Start: 2018-01-17 | End: 2018-01-18 | Stop reason: HOSPADM

## 2018-01-17 RX ORDER — DEXAMETHASONE SODIUM PHOSPHATE 4 MG/ML
8 INJECTION, SOLUTION INTRA-ARTICULAR; INTRALESIONAL; INTRAMUSCULAR; INTRAVENOUS; SOFT TISSUE ONCE AS NEEDED
Status: COMPLETED | OUTPATIENT
Start: 2018-01-17 | End: 2018-01-17

## 2018-01-17 RX ORDER — PHENYTOIN SODIUM 100 MG/1
100 CAPSULE, EXTENDED RELEASE ORAL DAILY
Status: DISCONTINUED | OUTPATIENT
Start: 2018-01-18 | End: 2018-01-18

## 2018-01-17 RX ORDER — SUCCINYLCHOLINE CHLORIDE 20 MG/ML
INJECTION INTRAMUSCULAR; INTRAVENOUS AS NEEDED
Status: DISCONTINUED | OUTPATIENT
Start: 2018-01-17 | End: 2018-01-17 | Stop reason: SURG

## 2018-01-17 RX ORDER — FENTANYL CITRATE 50 UG/ML
INJECTION, SOLUTION INTRAMUSCULAR; INTRAVENOUS AS NEEDED
Status: DISCONTINUED | OUTPATIENT
Start: 2018-01-17 | End: 2018-01-17 | Stop reason: SURG

## 2018-01-17 RX ORDER — FENTANYL CITRATE 50 UG/ML
25 INJECTION, SOLUTION INTRAMUSCULAR; INTRAVENOUS AS NEEDED
Status: DISCONTINUED | OUTPATIENT
Start: 2018-01-17 | End: 2018-01-17 | Stop reason: HOSPADM

## 2018-01-17 RX ORDER — CETIRIZINE HYDROCHLORIDE 10 MG/1
10 TABLET ORAL DAILY
Status: DISCONTINUED | OUTPATIENT
Start: 2018-01-17 | End: 2018-01-18 | Stop reason: HOSPADM

## 2018-01-17 RX ORDER — BUSPIRONE HYDROCHLORIDE 15 MG/1
7.5 TABLET ORAL 2 TIMES DAILY
Status: DISCONTINUED | OUTPATIENT
Start: 2018-01-17 | End: 2018-01-18 | Stop reason: HOSPADM

## 2018-01-17 RX ORDER — MAGNESIUM HYDROXIDE 1200 MG/15ML
LIQUID ORAL AS NEEDED
Status: DISCONTINUED | OUTPATIENT
Start: 2018-01-17 | End: 2018-01-17 | Stop reason: HOSPADM

## 2018-01-17 RX ORDER — DOCUSATE SODIUM 100 MG/1
100 CAPSULE, LIQUID FILLED ORAL 2 TIMES DAILY PRN
Status: DISCONTINUED | OUTPATIENT
Start: 2018-01-17 | End: 2018-01-18 | Stop reason: HOSPADM

## 2018-01-17 RX ADMIN — FENTANYL CITRATE 50 MCG: 50 INJECTION, SOLUTION INTRAMUSCULAR; INTRAVENOUS at 10:42

## 2018-01-17 RX ADMIN — KETAMINE HYDROCHLORIDE 20 MG: 100 INJECTION INTRAMUSCULAR; INTRAVENOUS at 10:12

## 2018-01-17 RX ADMIN — BUSPIRONE HYDROCHLORIDE 7.5 MG: 15 TABLET ORAL at 21:44

## 2018-01-17 RX ADMIN — HYDROMORPHONE HYDROCHLORIDE 0.5 MG: 2 INJECTION INTRAMUSCULAR; INTRAVENOUS; SUBCUTANEOUS at 12:44

## 2018-01-17 RX ADMIN — KETAMINE HYDROCHLORIDE 20 MG: 100 INJECTION INTRAMUSCULAR; INTRAVENOUS at 10:33

## 2018-01-17 RX ADMIN — FENTANYL CITRATE 50 MCG: 50 INJECTION, SOLUTION INTRAMUSCULAR; INTRAVENOUS at 09:59

## 2018-01-17 RX ADMIN — PHENYTOIN SODIUM 100 MG: 100 CAPSULE, EXTENDED RELEASE ORAL at 20:10

## 2018-01-17 RX ADMIN — ROCURONIUM BROMIDE 10 MG: 10 INJECTION INTRAVENOUS at 10:59

## 2018-01-17 RX ADMIN — MIDAZOLAM HYDROCHLORIDE 1 MG: 1 INJECTION, SOLUTION INTRAMUSCULAR; INTRAVENOUS at 09:54

## 2018-01-17 RX ADMIN — MIDAZOLAM HYDROCHLORIDE 1 MG: 1 INJECTION, SOLUTION INTRAMUSCULAR; INTRAVENOUS at 09:22

## 2018-01-17 RX ADMIN — IPRATROPIUM BROMIDE AND ALBUTEROL SULFATE 3 ML: .5; 3 SOLUTION RESPIRATORY (INHALATION) at 08:03

## 2018-01-17 RX ADMIN — ONDANSETRON 4 MG: 2 INJECTION, SOLUTION INTRAMUSCULAR; INTRAVENOUS at 08:30

## 2018-01-17 RX ADMIN — ROCURONIUM BROMIDE 30 MG: 10 INJECTION INTRAVENOUS at 10:11

## 2018-01-17 RX ADMIN — PROPOFOL 100 MG: 10 INJECTION, EMULSION INTRAVENOUS at 10:03

## 2018-01-17 RX ADMIN — CEFAZOLIN SODIUM 2 G: 2 SOLUTION INTRAVENOUS at 09:58

## 2018-01-17 RX ADMIN — KETAMINE HYDROCHLORIDE 10 MG: 100 INJECTION INTRAMUSCULAR; INTRAVENOUS at 11:02

## 2018-01-17 RX ADMIN — FENTANYL CITRATE 50 MCG: 50 INJECTION, SOLUTION INTRAMUSCULAR; INTRAVENOUS at 11:38

## 2018-01-17 RX ADMIN — SODIUM CHLORIDE, POTASSIUM CHLORIDE, SODIUM LACTATE AND CALCIUM CHLORIDE 9 ML/HR: 600; 310; 30; 20 INJECTION, SOLUTION INTRAVENOUS at 08:04

## 2018-01-17 RX ADMIN — FENTANYL CITRATE 50 MCG: 50 INJECTION, SOLUTION INTRAMUSCULAR; INTRAVENOUS at 10:52

## 2018-01-17 RX ADMIN — KETAMINE HYDROCHLORIDE 10 MG: 100 INJECTION INTRAMUSCULAR; INTRAVENOUS at 11:13

## 2018-01-17 RX ADMIN — LIDOCAINE HYDROCHLORIDE 100 MG: 20 INJECTION, SOLUTION INFILTRATION; PERINEURAL at 10:03

## 2018-01-17 RX ADMIN — FLUTICASONE PROPIONATE 2 SPRAY: 50 SPRAY, METERED NASAL at 21:46

## 2018-01-17 RX ADMIN — ROCURONIUM BROMIDE 5 MG: 10 INJECTION INTRAVENOUS at 10:03

## 2018-01-17 RX ADMIN — HYDROMORPHONE HYDROCHLORIDE 0.5 MG: 2 INJECTION INTRAMUSCULAR; INTRAVENOUS; SUBCUTANEOUS at 12:58

## 2018-01-17 RX ADMIN — HYDROMORPHONE HYDROCHLORIDE: 10 INJECTION INTRAMUSCULAR; INTRAVENOUS; SUBCUTANEOUS at 15:00

## 2018-01-17 RX ADMIN — KETAMINE HYDROCHLORIDE 10 MG: 100 INJECTION INTRAMUSCULAR; INTRAVENOUS at 10:29

## 2018-01-17 RX ADMIN — KETOROLAC TROMETHAMINE 30 MG: 30 INJECTION INTRAMUSCULAR; INTRAVENOUS at 11:30

## 2018-01-17 RX ADMIN — NEOSTIGMINE METHYLSULFATE 3 MG: 1 INJECTION, SOLUTION INTRAMUSCULAR; INTRAVENOUS; SUBCUTANEOUS at 11:51

## 2018-01-17 RX ADMIN — HYDROMORPHONE HYDROCHLORIDE 0.5 MG: 2 INJECTION INTRAMUSCULAR; INTRAVENOUS; SUBCUTANEOUS at 13:09

## 2018-01-17 RX ADMIN — LIDOCAINE HYDROCHLORIDE 0.5 ML: 10 INJECTION, SOLUTION EPIDURAL; INFILTRATION; INTRACAUDAL; PERINEURAL at 08:04

## 2018-01-17 RX ADMIN — DEXAMETHASONE SODIUM PHOSPHATE 8 MG: 4 INJECTION, SOLUTION INTRA-ARTICULAR; INTRALESIONAL; INTRAMUSCULAR; INTRAVENOUS; SOFT TISSUE at 08:30

## 2018-01-17 RX ADMIN — ROCURONIUM BROMIDE 10 MG: 10 INJECTION INTRAVENOUS at 11:13

## 2018-01-17 RX ADMIN — GLYCOPYRROLATE 0.2 MG: 0.2 INJECTION INTRAMUSCULAR; INTRAVENOUS at 08:30

## 2018-01-17 RX ADMIN — SUCCINYLCHOLINE CHLORIDE 120 MG: 20 INJECTION, SOLUTION INTRAMUSCULAR; INTRAVENOUS at 10:03

## 2018-01-17 RX ADMIN — ROCURONIUM BROMIDE 15 MG: 10 INJECTION INTRAVENOUS at 10:32

## 2018-01-17 RX ADMIN — FAMOTIDINE 20 MG: 10 INJECTION, SOLUTION INTRAVENOUS at 08:30

## 2018-01-17 RX ADMIN — GLYCOPYRROLATE 0.4 MG: 0.2 INJECTION INTRAMUSCULAR; INTRAVENOUS at 11:51

## 2018-01-17 RX ADMIN — LIDOCAINE HYDROCHLORIDE 100 MG: 20 INJECTION, SOLUTION INFILTRATION; PERINEURAL at 11:56

## 2018-01-17 RX ADMIN — SCOPALAMINE 1 PATCH: 1 PATCH, EXTENDED RELEASE TRANSDERMAL at 08:57

## 2018-01-17 NOTE — ANESTHESIA PROCEDURE NOTES
Airway  Urgency: elective    Airway not difficult    General Information and Staff    Patient location during procedure: OR    Indications and Patient Condition  Indications for airway management: airway protection    Preoxygenated: yes  MILS maintained throughout  Mask difficulty assessment: 1 - vent by mask    Final Airway Details  Final airway type: endotracheal airway      Successful airway: ETT  Cuffed: yes   Successful intubation technique: direct laryngoscopy  Facilitating devices/methods: intubating stylet  Endotracheal tube insertion site: oral  Blade: Evans  Blade size: #3  ETT size: 7.0 mm  Cormack-Lehane Classification: grade IIa - partial view of glottis (W PRESSURE)  Placement verified by: chest auscultation and capnometry   Measured from: lips  ETT to lips (cm): 22  Number of attempts at approach: 1

## 2018-01-17 NOTE — PLAN OF CARE
Problem: Patient Care Overview (Adult)  Goal: Plan of Care Review  Outcome: Ongoing (interventions implemented as appropriate)   01/17/18 0742   Coping/Psychosocial Response Interventions   Plan Of Care Reviewed With patient   Patient Care Overview   Progress improving   Outcome Evaluation   Outcome Summary/Follow up Plan vss. no complaints of pain. will continue to monitor

## 2018-01-17 NOTE — H&P
"    Patient Care Team:  DMITRY Cowan as PCP - General (Family Medicine)    Chief complaint menometrorrhagia       HPI: 42 y.o. patient who presents for a TLH/BS to treat her menometrorrhagia. Pt has had a long standing issue with this problem. Pt has a bicornuate uterus. She was placed on Micronor and has not had any improvement with her bleeding. Getting her cycle 2-3 times per month since starting. Pt has a Mirena IUD in one of her horns. IUD placed prior to dx of bicornuate uterus. Endometrial ablation was also attempted without success prior to diagnosis. Pt is a tobacco smoker and cannot be on combined OCPs. Will proceed with definitive treatment with TLH/BS.      PMH:   Past Medical History:   Diagnosis Date   • Chronic bronchitis    • Low blood potassium    • Seizures     last one month ago, states has \"light\" ones   • Sinus congestion    • Swelling     furosemide         PSH:   Past Surgical History:   Procedure Laterality Date   • APPENDECTOMY     • FRACTURE SURGERY Right     hand       SoHx:   Social History     Social History   • Marital status: Single     Spouse name: N/A   • Number of children: N/A   • Years of education: N/A     Occupational History   • Not on file.     Social History Main Topics   • Smoking status: Current Every Day Smoker     Packs/day: 0.50     Years: 25.00     Types: Cigarettes   • Smokeless tobacco: Never Used   • Alcohol use No   • Drug use: No   • Sexual activity: No     Other Topics Concern   • Not on file     Social History Narrative       FHx:   Family History   Problem Relation Age of Onset   • Pulmonary embolism Mother    • Diabetes Father    • Diabetes Sister    • Diabetes Brother    • Cancer Maternal Aunt    • Dementia Maternal Grandmother    • Parkinsonism Maternal Grandfather        PGyn Hx: UTD    POBHx: G0    Allergies: Morphine and related and Sulfa antibiotics    Medications:   No current facility-administered medications on file prior to encounter.  "     Current Outpatient Prescriptions on File Prior to Encounter   Medication Sig Dispense Refill   • furosemide (LASIX) 40 MG tablet Take 40 mg by mouth 2 (Two) Times a Day.     • Loratadine (CLARITIN) 10 MG capsule Take 1 tablet by mouth Daily.     • phenytoin (DILANTIN) 100 MG ER capsule Take 100 mg by mouth 2 (Two) Times a Day.     • potassium chloride (K-DUR) 10 MEQ CR tablet Take 10 mEq by mouth 2 (Two) Times a Day.     • spironolactone (ALDACTONE) 25 MG tablet Take 25 mg by mouth Daily.     • vitamin D (ERGOCALCIFEROL) 00626 units capsule capsule Take 50,000 Units by mouth Every 7 (Seven) Days.  0   • levonorgestrel (MIRENA) 20 MCG/24HR IUD 1 each by Intrauterine route 1 (One) Time.               Vital Signs  Temp:  [97.7 °F (36.5 °C)] 97.7 °F (36.5 °C)  Heart Rate:  [58-62] 58  Resp:  [16] 16  BP: (96)/(67) 96/67    Physical Exam:  General: NAD  Heart:: RRR  Lungs: clear  Abdomen: soft, NT/ND, obese  Genitalia: deferred to OR  Extremities: no calf tenderness    Labs: Hgb 13.6. HCG neg      Assessment/Plan: 41yo with bicornuate uterus and menometrorrhagia refractory to medical therapy. Proceed with TLH/BS        I discussed the patients findings and my recommendations with patient and family.     Carmelita Palma DO  01/17/18  9:26 AM

## 2018-01-17 NOTE — OP NOTE
Subjective     Date of Service:  01/17/18  Time of Service:  12:00    Surgical Staff: Surgeon(s) and Role:     * Carmelita Palma, DO - Primary   Additional Staff: JENNIFER hurtado   Pre-operative diagnosis(es): Pre-Op Diagnosis Codes:     * Menometrorrhagia [N92.1]     Post-operative diagnosis(es): Post-Op Diagnosis Codes:     * Menometrorrhagia [N92.1]   Procedure(s): Procedure(s):  TOTAL LAPAROSCOPIC HYSTERECTOMY, bilateral salpingectomy, IUD removal, and bilateral  ovarian cystoscopy     Antibiotics: cefazolin (Ancef) ordered on call to OR     Anesthesia: Type: Choice  ASA:  II     Objective      Operative findings: Bicornuate uterus. Bilateral ovarian cysts. RLQ bowel adhesions   Specimens removed:   ID Type Source Tests Collected by Time Destination   A :  Tissue Uterus, Cervix, Bilateral Fallopian Tubes  TISSUE EXAM Carmelita Palma, DO 1/17/2018 1121    B : ovarian cyst wall Tissue Ovary, Right TISSUE EXAM Carmelita Palma, DO 1/17/2018 1142       Fluid Intake: 800mL   Output: Documented Output  Est. Blood Loss 75mL  Urine Output 700mL      I/O this shift:  In: 800 [I.V.:800]  Out: 775 [Urine:700; Blood:75]     Blood products used: No   Drains: Urethral Catheter 01/17/18 1031 16 10 10 (Active)   Daily Indications Monitoring of strict I &O 1/17/2018 12:22 PM       [REMOVED] Naso/Oral/Gastric Tube 01/17/18 1011 [facility] mouth (Removed)   Removed 01/17/18 1157      Implant Information: Nothing was implanted during the procedure   Complications: None apparent   Intraoperative consult(s):    Condition: stable   Disposition: to PACU and then admit to  medical - surgical floor         Assessment/Plan   42 y.o. patient who presents for a TLH/BS to treat her menometrorrhagia. Pt has had a long standing issue with this problem. Pt has a bicornuate uterus. She was placed on Micronor and has not had any improvement with her bleeding. Getting her cycle 2-3 times per month since starting. Pt has a Mirena IUD in one of her  horns. IUD placed prior to dx of bicornuate uterus. Endometrial ablation was also attempted without success prior to diagnosis. Pt is a tobacco smoker and cannot be on combined OCPs.  Patient declines a preop spinal for postop pain control.  The procedure was reviewed again with the patient and her family.  The postop course was reviewed.  All questions were answered.  The patient was taken back to the operating room and placed under general anesthesia with an endotracheal tube in place.  Patient was carefully placed in the dorsolithotomy position and prepped and draped in the normal sterile fashion.  A Leonard catheter was placed into the bladder and hung to gravity for the duration of the procedure.  Patient was given IV antibiotics for ID prophylaxis.  A speculum was placed into the vagina and the single tooth tenaculum was used to grasp the anterior lip the cervix.  The IUD strings were grasped with a ring forcep and the IUD was removed without difficulty.  Stay sutures were placed at the 3 and the 9 o'clock position.  The cervical os was gently dilated and a uterine manipulator was placed.  Attention was then turned to the abdomen where a small infra umbilical incision was made.  A 5 mm trocar was placed without difficulty and the abdomen was insufflated with CO2 gas.  Under direct visualization a 10 mm trocar was placed in the left lower quadrant.  A survey the patient's abdomen revealed bowel adhesions in the right lower quadrant from the patient's previous appendectomy.  The uterus was visualized as bicornuate.  There was a 3 cm simple appearing cyst and the right ovary and a 2 cm simple appearing cyst on the left ovary.  Using the Harmonic scalpel, lysis of adhesions was performed in the right lower quadrant.  Once the bowel was sufficiently away a 5 mm trocar was placed into the right lower quadrant without difficulty.  The patient's left fallopian tube was followed out to the fimbriated end and a  salpingectomy was performed.  The the left round ligament was grasped, coagulated, and transected with the Harmonic Scalpel.  The left tubo-ovarian vessels were grasped, coagulated, and transected as well.  The anterior leaf of the broad ligament was entered and a bladder flap was created. The uterine artery was skeletonized and bipolar coagulation was used to destroy the left uterine artery.  In the same fashion the patient's right fallopian tube was followed out to the fimbriated end and a salpingectomy was performed.  The right round ligament and tubo-ovarian vessels were grasped, coagulated, and transected with the Harmonic scalpel.  The right uterine artery was dissected and the right uterine artery was destroyed with bipolar coagulation. A circumferential incision was made  the uterus and cervix from the top of the vagina. The uterus was removed and sent to pathology and a bulb was placed into the vagina to maintain pneumoperitoneum.  The vaginal cuff was reapproximated with several 0 Vicryl sutures.  Inspection of the pedicles revealed hemostasis.  The abdomen was copiously irrigated and aspirated.  The ovarian cyst on the right and the left ovary were ruptured and a portion of the cyst wall was sent to pathology from the right ovarian cyst.  The fluid from both of the cysts was clear and the cysts appeared to be simple in nature.  Due to the adhesions in the abdomen as well as the history of the bicornuate uterus, the decision was made to proceed with cystoscopy to fully evaluate the ureters.  The Leonard catheter was removed and the bladder was backfilled with normal saline.  The laparoscope was then inserted into the urethra and bilateral ureteral peristalsis was noted.  The scope was removed and the Leonard was replaced.  All the trochars were then removed from the patient's abdomen and the CO2 gas was released.  The skin incisions were reapproximated with 4-0 Vicryl in a subcuticular fashion.   Patient tolerated procedure well.  There were no apparent complications and patient was in stable condition to postanesthesia recovery.

## 2018-01-17 NOTE — ANESTHESIA PREPROCEDURE EVALUATION
" Anesthesia Evaluation     Patient summary reviewed and Nursing notes reviewed   history of anesthetic complications (morphine and sulfa makes her sick, anesthesia makes her sick): PONV  NPO Solid Status: > 8 hours  NPO Liquid Status: > 8 hours     Airway   Mallampati: III  TM distance: >3 FB  Neck ROM: full  possible difficult intubation  Dental      Comment: POOR DENTITION/ BOTH UPPER INCISORS BROKEN OFF/ NUBS  MISSING OTHER UPPER TEETH      Pulmonary     breath sounds clear to auscultation  (+) a smoker (0.5 ppd) Current, decreased breath sounds,     ROS comment: History of chronic bronchitis-uses inhaler as needed.  Used last yesterday.  Cardiovascular   Exercise tolerance: good (4-7 METS)    Rhythm: regular  Rate: normal        Neuro/Psych  (+) seizures (dilantin, last \"bad one\" 1994), headaches (occ), dizziness/light headedness (frequent dizziness, \"comes and goes\"-unsure of cause, PCP aware per patient),       ROS Comment: History of meningitis as a baby, seizures due to that.    GI/Hepatic/Renal/Endo    (+) obesity,      Musculoskeletal         ROS comment: Reports has thrown hips out when working outside-2 years ago- no problems.  Abdominal    Substance History      OB/GYN          Other - negative ROS                                           Anesthesia Plan    ASA 2     general   (No ITN-patient refuses and allergy to morphine.)  Anesthetic plan and risks discussed with patient.  Use of blood products discussed with patient .     "

## 2018-01-17 NOTE — PLAN OF CARE
Problem: Perioperative Period (Adult)  Goal: Signs and Symptoms of Listed Potential Problems Will be Absent or Manageable (Perioperative Period)  Outcome: Ongoing (interventions implemented as appropriate)   01/17/18 0743   Perioperative Period   Problems Assessed (Perioperative Period) all   Problems Present (Perioperative Period) none

## 2018-01-17 NOTE — PLAN OF CARE
Problem: Patient Care Overview (Adult)  Goal: Adult Individualization and Mutuality  Outcome: Ongoing (interventions implemented as appropriate)   01/17/18 0743   Individualization   Patient Specific Preferences goes by Kayli   Patient Specific Interventions appropriate interventions implemented as needed

## 2018-01-17 NOTE — PLAN OF CARE
Problem: Patient Care Overview (Adult)  Goal: Plan of Care Review  Outcome: Ongoing (interventions implemented as appropriate)   01/17/18 1303   Coping/Psychosocial Response Interventions   Plan Of Care Reviewed With patient   Patient Care Overview   Progress improving   Outcome Evaluation   Outcome Summary/Follow up Plan vitals stable, pain score less than 6 upon dc from pacu

## 2018-01-17 NOTE — ANESTHESIA POSTPROCEDURE EVALUATION
Patient: Kayli Calles    Procedure Summary     Date Anesthesia Start Anesthesia Stop Room / Location    01/17/18 0958 1233 [facility] / [facility]       Procedure Diagnosis Surgeon Provider    TOTAL LAPAROSCOPIC HYSTERECTOMY, bilateral salpingectomy, IUD removal, and bilateral  ovarian cystoscopy (Bilateral Abdomen) Menometrorrhagia  (Menometrorrhagia [N92.1]) DO Pily Ya, CACHORRO          Anesthesia Type: general  Last vitals  BP   127/80 (01/17/18 1312)   Temp   97.7 °F (36.5 °C) (01/17/18 0736)   Pulse   60 (01/17/18 1312)   Resp   16 (01/17/18 1247)     SpO2   98 % (01/17/18 1312)     Post Anesthesia Care and Evaluation    Patient location during evaluation: PACU  Patient participation: complete - patient participated  Level of consciousness: responsive to verbal stimuli and sleepy but conscious  Pain management: adequate  Airway patency: patent  Anesthetic complications: No anesthetic complications (pt had some pharyngeal bleeding @ the end of the case that stopped b4 extubation/ none seen n RR)  PONV Status: none  Cardiovascular status: acceptable and hemodynamically stable  Respiratory status: acceptable and nasal cannula  Hydration status: acceptable    Comments: PACU RN TO REPORT TO FLOOR RE PREV PHARYNGEAL BLEEDING, TO OBSERVE. PT HAD ICECHIPS IN RR W NO FURTHER ISSUES

## 2018-01-17 NOTE — PLAN OF CARE
Problem: Patient Care Overview (Adult)  Goal: Adult Individualization and Mutuality  Outcome: Ongoing (interventions implemented as appropriate)   01/17/18 0743 01/17/18 1304   Individualization   Patient Specific Preferences goes by Kayli --    Patient Specific Goals --  pain score less than 6 when dc from pacu   Patient Specific Interventions --  pain medication administration   Mutuality/Individual Preferences   What Anxieties, Fears or Concerns Do You Have About Your Health or Care? --  pain after surgery

## 2018-01-17 NOTE — PLAN OF CARE
Problem: Perioperative Period (Adult)  Goal: Signs and Symptoms of Listed Potential Problems Will be Absent or Manageable (Perioperative Period)  Outcome: Ongoing (interventions implemented as appropriate)   01/17/18 1305   Perioperative Period   Problems Assessed (Perioperative Period) pain;physiologic stress response   Problems Present (Perioperative Period) pain;physiologic stress response

## 2018-01-18 VITALS
HEIGHT: 69 IN | OXYGEN SATURATION: 96 % | SYSTOLIC BLOOD PRESSURE: 103 MMHG | RESPIRATION RATE: 16 BRPM | DIASTOLIC BLOOD PRESSURE: 57 MMHG | HEART RATE: 62 BPM | TEMPERATURE: 98.2 F | WEIGHT: 263 LBS | BODY MASS INDEX: 38.95 KG/M2

## 2018-01-18 LAB
DEPRECATED RDW RBC AUTO: 48 FL (ref 37–54)
ERYTHROCYTE [DISTWIDTH] IN BLOOD BY AUTOMATED COUNT: 14.3 % (ref 11.5–14.5)
HCT VFR BLD AUTO: 34.7 % (ref 37–47)
HGB BLD-MCNC: 11.8 G/DL (ref 12–16)
MCH RBC QN AUTO: 31.3 PG (ref 27–31)
MCHC RBC AUTO-ENTMCNC: 34 G/DL (ref 31–37)
MCV RBC AUTO: 92 FL (ref 81–99)
PLATELET # BLD AUTO: 204 10*3/MM3 (ref 140–500)
PMV BLD AUTO: 10.3 FL (ref 7.4–10.4)
RBC # BLD AUTO: 3.77 10*6/MM3 (ref 4.2–5.4)
WBC NRBC COR # BLD: 7.57 10*3/MM3 (ref 4.8–10.8)

## 2018-01-18 PROCEDURE — 85027 COMPLETE CBC AUTOMATED: CPT | Performed by: OBSTETRICS & GYNECOLOGY

## 2018-01-18 PROCEDURE — 99024 POSTOP FOLLOW-UP VISIT: CPT | Performed by: NURSE PRACTITIONER

## 2018-01-18 PROCEDURE — G0378 HOSPITAL OBSERVATION PER HR: HCPCS

## 2018-01-18 PROCEDURE — 90686 IIV4 VACC NO PRSV 0.5 ML IM: CPT | Performed by: OBSTETRICS & GYNECOLOGY

## 2018-01-18 PROCEDURE — 25010000002 INFLUENZA VAC SPLIT QUAD 0.5 ML SUSPENSION PREFILLED SYRINGE: Performed by: OBSTETRICS & GYNECOLOGY

## 2018-01-18 PROCEDURE — G0008 ADMIN INFLUENZA VIRUS VAC: HCPCS | Performed by: OBSTETRICS & GYNECOLOGY

## 2018-01-18 RX ORDER — OXYCODONE HYDROCHLORIDE AND ACETAMINOPHEN 5; 325 MG/1; MG/1
1 TABLET ORAL EVERY 4 HOURS PRN
Status: DISCONTINUED | OUTPATIENT
Start: 2018-01-18 | End: 2018-01-18 | Stop reason: HOSPADM

## 2018-01-18 RX ORDER — IBUPROFEN 800 MG/1
800 TABLET ORAL EVERY 8 HOURS SCHEDULED
Qty: 30 TABLET | Refills: 0 | Status: SHIPPED | OUTPATIENT
Start: 2018-01-18

## 2018-01-18 RX ORDER — PSEUDOEPHEDRINE HCL 30 MG
100 TABLET ORAL 2 TIMES DAILY PRN
Qty: 30 CAPSULE | Refills: 0 | Status: SHIPPED | OUTPATIENT
Start: 2018-01-18 | End: 2019-03-14

## 2018-01-18 RX ORDER — IBUPROFEN 800 MG/1
TABLET ORAL
Status: COMPLETED
Start: 2018-01-18 | End: 2018-01-18

## 2018-01-18 RX ORDER — PHENYTOIN SODIUM 100 MG/1
400 CAPSULE, EXTENDED RELEASE ORAL EVERY 12 HOURS SCHEDULED
Status: DISCONTINUED | OUTPATIENT
Start: 2018-01-18 | End: 2018-01-18 | Stop reason: HOSPADM

## 2018-01-18 RX ORDER — POLYETHYLENE GLYCOL 3350 17 G/17G
17 POWDER, FOR SOLUTION ORAL DAILY
Qty: 15 PACKET | Refills: 0 | Status: SHIPPED | OUTPATIENT
Start: 2018-01-18 | End: 2019-03-14

## 2018-01-18 RX ORDER — OXYCODONE HYDROCHLORIDE AND ACETAMINOPHEN 5; 325 MG/1; MG/1
2 TABLET ORAL EVERY 4 HOURS PRN
Status: DISCONTINUED | OUTPATIENT
Start: 2018-01-18 | End: 2018-01-18 | Stop reason: HOSPADM

## 2018-01-18 RX ORDER — IBUPROFEN 800 MG/1
800 TABLET ORAL EVERY 8 HOURS SCHEDULED
Status: DISCONTINUED | OUTPATIENT
Start: 2018-01-18 | End: 2018-01-18 | Stop reason: HOSPADM

## 2018-01-18 RX ORDER — OXYCODONE HYDROCHLORIDE AND ACETAMINOPHEN 5; 325 MG/1; MG/1
2 TABLET ORAL EVERY 4 HOURS PRN
Qty: 30 TABLET | Refills: 0 | Status: SHIPPED | OUTPATIENT
Start: 2018-01-18 | End: 2018-01-28

## 2018-01-18 RX ADMIN — SPIRONOLACTONE 25 MG: 25 TABLET ORAL at 08:58

## 2018-01-18 RX ADMIN — MONTELUKAST SODIUM 10 MG: 10 TABLET, FILM COATED ORAL at 08:58

## 2018-01-18 RX ADMIN — SODIUM CHLORIDE, POTASSIUM CHLORIDE, SODIUM LACTATE AND CALCIUM CHLORIDE 100 ML/HR: 600; 310; 30; 20 INJECTION, SOLUTION INTRAVENOUS at 03:20

## 2018-01-18 RX ADMIN — POTASSIUM CHLORIDE 10 MEQ: 750 TABLET, FILM COATED, EXTENDED RELEASE ORAL at 08:58

## 2018-01-18 RX ADMIN — IBUPROFEN 800 MG: 800 TABLET ORAL at 13:12

## 2018-01-18 RX ADMIN — OXYCODONE HYDROCHLORIDE AND ACETAMINOPHEN 2 TABLET: 5; 325 TABLET ORAL at 10:09

## 2018-01-18 RX ADMIN — INFLUENZA VIRUS VACCINE 0.5 ML: 15; 15; 15; 15 SUSPENSION INTRAMUSCULAR at 13:54

## 2018-01-18 RX ADMIN — PHENYTOIN SODIUM 400 MG: 100 CAPSULE, EXTENDED RELEASE ORAL at 09:09

## 2018-01-18 RX ADMIN — BUSPIRONE HYDROCHLORIDE 7.5 MG: 15 TABLET ORAL at 08:58

## 2018-01-18 RX ADMIN — FUROSEMIDE 40 MG: 40 TABLET ORAL at 08:58

## 2018-01-18 RX ADMIN — CETIRIZINE HYDROCHLORIDE 10 MG: 10 TABLET, FILM COATED ORAL at 08:58

## 2018-01-18 RX ADMIN — FLUTICASONE PROPIONATE 2 SPRAY: 50 SPRAY, METERED NASAL at 09:08

## 2018-01-18 NOTE — NURSING NOTE
Case Management Discharge Note    Final Note: Patient discharged home to self care.    Discharge Placement     No information found             Discharge Codes: 01  Discharge to home

## 2018-01-18 NOTE — PROGRESS NOTES
Patient: Kayli Calles  Procedure(s):  TOTAL LAPAROSCOPIC HYSTERECTOMY, bilateral salpingectomy, IUD removal, and bilateral  ovarian cystoscopy  Anesthesia type: [unfilled]    Patient location: Labor and Delivery  Last vitals:   Vitals:    01/18/18 0719   BP: 103/57   Pulse: 62   Resp: 16   Temp: 98.2 °F (36.8 °C)   SpO2: 96%     Level of consciousness: awake, alert and oriented    Post-anesthesia pain: adequate analgesia  Airway patency: patent  Respiratory: unassisted  Cardiovascular: stable and blood pressure at baseline  Hydration: euvolemic    Anesthetic complications: no

## 2018-01-18 NOTE — NURSING NOTE
Continued Stay Note  MIESHA Melendez     Patient Name: Kayli Calles  MRN: [id number]  Today's Date: 1/18/2018    Admit Date: 1/17/2018          Discharge Plan       01/18/18 1310    Case Management/Social Work Plan    Plan Plan is home.    Additional Comments Patient in agreement with speaking to  with aunt at bedside.  She lives in a one level home with sister.  Her aunt has come from out of town to assist patient upon discharge.  She has been independent with ADLs.  She has a nebulizer machine which she owns.  She has no other medical equipment.  She has had home health many years ago but not presently.  She fills scripts at [facility] pharmacy in [place] and has no issues getting or paying for them.  She does not have a Living Will and per request left information.  Patient will contact Berta Kumari prior to discharge if she decides she would like to fill out Living Will form.    will continue to follow.               Discharge Codes     None            Terrie Ny RN

## 2018-01-18 NOTE — PLAN OF CARE
Problem: Patient Care Overview (Adult)  Goal: Plan of Care Review  Outcome: Ongoing (interventions implemented as appropriate)    Goal: Adult Individualization and Mutuality  Outcome: Ongoing (interventions implemented as appropriate)    Goal: Discharge Needs Assessment  Outcome: Ongoing (interventions implemented as appropriate)   01/18/18 0733   Discharge Needs Assessment   Concerns To Be Addressed no discharge needs identified       Problem: Perioperative Period (Adult)  Goal: Signs and Symptoms of Listed Potential Problems Will be Absent or Manageable (Perioperative Period)  Outcome: Ongoing (interventions implemented as appropriate)

## 2018-01-18 NOTE — PROGRESS NOTES
"Patient Care Team:  DMITRY Cowan as PCP - General (Family Medicine)        Chief Complaint:  POD #1 S/P TLH with jony salpingectomy and jony ovarian cystectomy     Subjective    Interval History and ROS:     Patient States she wants to get out of bed to move. She has not voided yet. She tolerated her breakfast. Her pain is fairly well controlled.   Patient Complaints: Tenderness in her abdomen.  Patient Denies:  Passing gas, vaginal bleeding, urination or uncontrolled pain   History taken from: patient chart      Objective    Vital Signs  Temp:  [97.6 °F (36.4 °C)-98.2 °F (36.8 °C)] 98.2 °F (36.8 °C)  Heart Rate:  [60-90] 62  Resp:  [13-22] 16  BP: ()/(49-96) 103/57    Flowsheet Rows         First Filed Value    Admission Height  175.3 cm (69\") Documented at 01/17/2018 0736    Admission Weight  119 kg (263 lb) Documented at 01/17/2018 0736          Physical Exam:  General Appearance: alert, pleasant, appears stated age and cooperative  Lungs: clear to auscultation, respirations regular, respirations even and respirations unlabored  Abdomen: normal bowel sounds, soft non-tender, no guarding and tenderness around incision. Dressing C/D/I x 3   Extremities: moves extremities well, no edema, no tenderness, Zelad's sign negative and SCDs in place   Skin: no bleeding, bruising or rash and color normal  Psych: normal    Results Review:     I reviewed the patient's new clinical results.    Lab Results (last 24 hours)     Procedure Component Value Units Date/Time    Tissue Pathology Exam - Tissue, Uterus, Cervix, Bilateral Fallopian Tubes [[id number]] Collected:  01/17/18 1121    Specimen:  Tissue from Uterus, Cervix, Bilateral Fallopian Tubes ; Tissue from Ovary, Right Updated:  01/17/18 1314    CBC (No Diff) [[id number]]  (Abnormal) Collected:  01/18/18 0626    Specimen:  Blood Updated:  01/18/18 0629     WBC 7.57 10*3/mm3      RBC 3.77 (L) 10*6/mm3      Hemoglobin 11.8 (L) g/dL      Hematocrit 34.7 (L) %  "     MCV 92.0 fL      MCH 31.3 (H) pg      MCHC 34.0 g/dL      RDW 14.3 %      RDW-SD 48.0 fl      MPV 10.3 fL      Platelets 204 10*3/mm3           Imaging Results (last 24 hours)     ** No results found for the last 24 hours. **          Xray not reviewed personally by physician.      ECG not reviewed personally by physician  ECG/EMG Results (most recent)     None          Medication Review:   I have reviewed the patient's current medication list    Current Facility-Administered Medications:   •  busPIRone (BUSPAR) tablet 7.5 mg, 7.5 mg, Oral, BID, Carmelita Palma DO, 7.5 mg at 01/18/18 0858  •  cetirizine (zyrTEC) tablet 10 mg, 10 mg, Oral, Daily, Carmelita Palma DO, 10 mg at 01/18/18 0858  •  diphenhydrAMINE (BENADRYL) injection 25 mg, 25 mg, Intravenous, Q6H PRN, Carmelita Palma DO  •  docusate sodium (COLACE) capsule 100 mg, 100 mg, Oral, BID PRN, Carmelita Palma DO  •  fluticasone (FLONASE) 50 MCG/ACT nasal spray 2 spray, 2 spray, Nasal, Daily, Carmelita Palma DO, 2 spray at 01/18/18 0908  •  furosemide (LASIX) tablet 40 mg, 40 mg, Oral, Daily, Carmelita Palma DO, 40 mg at 01/18/18 0858  •  ibuprofen (ADVIL,MOTRIN) tablet 800 mg, 800 mg, Oral, Q8H, Pratibha Rooney, DMITRY  •  Influenza Vac Subunit Quad (FLUCELVAX) injection 0.5 mL, 0.5 mL, Intramuscular, During Hospitalization, Marco Jackson MD  •  montelukast (SINGULAIR) tablet 10 mg, 10 mg, Oral, Daily, Carmelita Palma DO, 10 mg at 01/18/18 0858  •  naloxone (NARCAN) injection 0.1 mg, 0.1 mg, Intravenous, Q5 Min PRN, Carmelita Palma DO  •  ondansetron (ZOFRAN) tablet 4 mg, 4 mg, Oral, Q6H PRN **OR** ondansetron ODT (ZOFRAN-ODT) disintegrating tablet 4 mg, 4 mg, Oral, Q6H PRN **OR** ondansetron (ZOFRAN) injection 4 mg, 4 mg, Intravenous, Q6H PRN, Carmelita Palma DO  •  oxyCODONE-acetaminophen (PERCOCET) 5-325 MG per tablet 1 tablet, 1 tablet, Oral, Q4H PRN, DMITRY Saul  •  oxyCODONE-acetaminophen (PERCOCET) 5-325 MG per tablet 2  "tablet, 2 tablet, Oral, Q4H PRN, DMITRY Saul  •  phenytoin (DILANTIN) ER capsule 400 mg, 400 mg, Oral, Q12H, Marco Jackson MD  •  potassium chloride (K-DUR) CR tablet 10 mEq, 10 mEq, Oral, Daily, Carmelita Palma DO, 10 mEq at 01/18/18 0858  •  Scopolamine (TRANSDERM-SCOP) 1.5 MG/3DAYS patch 1 patch, 1 patch, Transdermal, Q72H, Shruthi Bland MD, 1 patch at 01/17/18 0857  •  spironolactone (ALDACTONE) tablet 25 mg, 25 mg, Oral, Daily, Carmelita Palma DO, 25 mg at 01/18/18 0858      Assessment/Plan     1) S/P TLH with bilateral salpingectomy and jony ovarian cystectomy- progressing well. Hgb 11.8    2) Post op care- Advance. D/C IV fluids. Stop PCA and convert to oral pain medication.  Pt reports she can not take Morphine because it \"made me sick.\" Enc ambulation.     3) Dispo- Plan home later today when pt has voided, + flatus, ambulating without difficulty, pain well controlled and tolerating diet.     Plan for disposition:possibly later today if progressing well     DMITRY Saul  01/18/18  10:01 AM      Time: More than 50% of time spent in counseling and coordination of care:  Total face-to-face/floor time 20 min.  Time spent in counseling 15 min. Counseling included the following topics: plan of care, pain medication, ambulation, etc.     "

## 2018-01-18 NOTE — DISCHARGE SUMMARY
Date of Discharge:  1/18/2018    Discharge Diagnosis:   MENOMETRORRHAGIA    Problem List:  Active Problems:    Menometrorrhagia    Bicornuate uterus      Presenting Problem/History of Present Illness  Menometrorrhagia [N92.1]  Menometrorrhagia [N92.1]        Hospital Course  Patient is a 42 y.o. female presented FOR [facility].      Procedures Performed  Procedure(s):  TOTAL LAPAROSCOPIC HYSTERECTOMY, bilateral salpingectomy, IUD removal, and bilateral  ovarian cystoscopy       Consults:   Consults     No orders found for last 30 day(s).          Pertinent Test Results:   Lab Results (last 24 hours)     Procedure Component Value Units Date/Time    CBC (No Diff) [[id number]]  (Abnormal) Collected:  01/18/18 0626    Specimen:  Blood Updated:  01/18/18 0629     WBC 7.57 10*3/mm3      RBC 3.77 (L) 10*6/mm3      Hemoglobin 11.8 (L) g/dL      Hematocrit 34.7 (L) %      MCV 92.0 fL      MCH 31.3 (H) pg      MCHC 34.0 g/dL      RDW 14.3 %      RDW-SD 48.0 fl      MPV 10.3 fL      Platelets 204 10*3/mm3           Condition on Discharge:  SATIS.  PT PASSED GAS AND VOIDED NORMALLY    Vital Signs  Temp:  [97.6 °F (36.4 °C)-98.2 °F (36.8 °C)] 98.2 °F (36.8 °C)  Heart Rate:  [60-90] 62  Resp:  [13-22] 16  BP: ()/(49-96) 103/57    Discharge Disposition  Home or Self Care    Discharge Medications   Kayli Calles   Home Medication Instructions SIERRA:[id number]    Printed on:01/18/18 1324   Medication Information                      albuterol (PROVENTIL) (2.5 MG/3ML) 0.083% nebulizer solution  Take 2.5 mg by nebulization Every 4 (Four) Hours As Needed for Wheezing (every 4-6 hours).             busPIRone (BUSPAR) 7.5 MG tablet  Take 7.5 mg by mouth 2 (Two) Times a Day.             cetirizine (zyrTEC) 10 MG tablet  Take 10 mg by mouth Daily.             docusate sodium 100 MG capsule  Take 100 mg by mouth 2 (Two) Times a Day As Needed for Constipation.             fluticasone (FLONASE) 50 MCG/ACT nasal spray  2 sprays into each  nostril Daily.             furosemide (LASIX) 40 MG tablet  Take 40 mg by mouth 2 (Two) Times a Day.             ibuprofen (ADVIL,MOTRIN) 800 MG tablet  Take 1 tablet by mouth Every 8 (Eight) Hours.             levonorgestrel (MIRENA) 20 MCG/24HR IUD  1 each by Intrauterine route 1 (One) Time.             Loratadine (CLARITIN) 10 MG capsule  Take 1 tablet by mouth Daily.             montelukast (SINGULAIR) 10 MG tablet  Take 10 mg by mouth Daily.             oxyCODONE-acetaminophen (PERCOCET) 5-325 MG per tablet  Take 2 tablets by mouth Every 4 (Four) Hours As Needed for Severe Pain  for up to 10 days.             phenytoin (DILANTIN) 100 MG ER capsule  Take 100 mg by mouth 2 (Two) Times a Day.             polyethylene glycol (MIRALAX) packet  Take 17 g by mouth Daily.             potassium chloride (K-DUR) 10 MEQ CR tablet  Take 10 mEq by mouth 2 (Two) Times a Day.             spironolactone (ALDACTONE) 25 MG tablet  Take 25 mg by mouth Daily.             vitamin D (ERGOCALCIFEROL) 38984 units capsule capsule  Take 50,000 Units by mouth Every 7 (Seven) Days.                 Discharge Diet   Diet Instructions     Diet: Regular; Thin       Discharge Diet:  Regular   Fluid Consistency:  Thin                 Activity at Discharge  Activity Instructions     Activity as Tolerated           Pelvic Rest                     Follow-up Appointments  No future appointments.  Additional Instructions for the Follow-ups that You Need to Schedule     Call MD With Problems / Concerns    As directed    Instructions: Instructions after dischage:  No driving for 2 weeks, call for temp>101, heavy vaginal bleeding, increasing lower abdominal pain or signs of incisional infection .  No intercourse, tampons or anything in the vagina for 6 weeks. No lifting over 10lbs. Bath or shower is fine. Take dressings off in 1-2 days, may leave open to air. Any problems call the office 24/7 @ [phone]   Order Comments:  Instructions: Instructions  after dischage:  No driving for 2 weeks, call for temp>101, heavy vaginal bleeding, increasing lower abdominal pain or signs of incisional infection .  No intercourse, tampons or anything in the vagina for 6 weeks. No lifting over 10lbs. Bath or shower is fine. Take dressings off in 1-2 days, may leave open to air. Any problems call the office 24/7 @ [phone]            Discharge Follow-up with Specified Provider: DR LOPES; 2 Weeks    As directed    To:  DR LOPES    Follow Up:  2 Weeks                       Marco Jackson MD  1:23 PM  1/18/2018

## 2018-01-18 NOTE — NURSING NOTE
Pt d/c home via w/c, family at side,pt a/o x 3, no c/o pain, no s/s of distress noted, iv removed earlier, pt tolerated without diff, d/c instructions given, pt verbalizes understanding

## 2018-01-18 NOTE — PLAN OF CARE
Problem: Patient Care Overview (Adult)  Goal: Discharge Needs Assessment  Outcome: Ongoing (interventions implemented as appropriate)   01/18/18 1309 01/18/18 1317   Discharge Needs Assessment   Concerns To Be Addressed denies needs/concerns at this time --    Readmission Within The Last 30 Days no previous admission in last 30 days --    Discharge Planning Comments --  Patient in agreement with speaking to  with aunt at bedside. She lives in a one level home with sister. Her aunt has come from out of town to assist patient upon discharge. She has been independent with ADLs. She has a nebulizer machine which she owns. She has no other medical equipment. She has had home health many years ago but not presently. She fills scripts at [facility] in [place] and has no issues getting or paying for them. She does not have a Living Will and per request left information. Patient will contact Berta Kumari prior to discharge if she decides she would like to fill out Living Will form.  will continue to follow.    Living Environment   Transportation Available car;family or friend will provide --    Self-Care   Equipment Currently Used at Home nebulizer --

## 2018-01-22 LAB
LAB AP CASE REPORT: NORMAL
Lab: NORMAL
PATH REPORT.FINAL DX SPEC: NORMAL

## 2018-01-23 ENCOUNTER — HOSPITAL ENCOUNTER (EMERGENCY)
Facility: HOSPITAL | Age: 43
Discharge: HOME | End: 2018-01-23
Attending: EMERGENCY MEDICINE | Admitting: EMERGENCY MEDICINE

## 2018-01-23 VITALS
OXYGEN SATURATION: 96 % | SYSTOLIC BLOOD PRESSURE: 107 MMHG | HEIGHT: 69 IN | TEMPERATURE: 97.9 F | DIASTOLIC BLOOD PRESSURE: 59 MMHG | WEIGHT: 240 LBS | BODY MASS INDEX: 35.55 KG/M2 | RESPIRATION RATE: 16 BRPM | HEART RATE: 59 BPM

## 2018-01-23 DIAGNOSIS — E86.9 FLUID VOLUME DEPLETION: Primary | ICD-10-CM

## 2018-01-23 LAB
ALBUMIN SERPL-MCNC: 4 G/DL (ref 3.5–5.2)
ALBUMIN/GLOB SERPL: 1.4 G/DL
ALP SERPL-CCNC: 120 U/L (ref 40–129)
ALT SERPL W P-5'-P-CCNC: 6 U/L (ref 5–33)
ANION GAP SERPL CALCULATED.3IONS-SCNC: 11.8 MMOL/L
AST SERPL-CCNC: 10 U/L (ref 5–32)
BASOPHILS # BLD AUTO: 0.03 10*3/MM3 (ref 0–0.2)
BASOPHILS NFR BLD AUTO: 0.4 % (ref 0–2)
BILIRUB SERPL-MCNC: <0.2 MG/DL (ref 0.2–1.2)
BUN BLD-MCNC: 21 MG/DL (ref 6–20)
BUN/CREAT SERPL: 17.5 (ref 7–25)
CALCIUM SPEC-SCNC: 8.6 MG/DL (ref 8.6–10.5)
CHLORIDE SERPL-SCNC: 98 MMOL/L (ref 98–107)
CO2 SERPL-SCNC: 29.2 MMOL/L (ref 22–29)
CREAT BLD-MCNC: 1.2 MG/DL (ref 0.57–1)
DEPRECATED RDW RBC AUTO: 45.5 FL (ref 37–54)
EOSINOPHIL # BLD AUTO: 0.07 10*3/MM3 (ref 0.1–0.3)
EOSINOPHIL NFR BLD AUTO: 1 % (ref 0–4)
ERYTHROCYTE [DISTWIDTH] IN BLOOD BY AUTOMATED COUNT: 13.8 % (ref 11.5–14.5)
GFR SERPL CREATININE-BSD FRML MDRD: 49 ML/MIN/1.73
GLOBULIN UR ELPH-MCNC: 2.9 GM/DL
GLUCOSE BLD-MCNC: 106 MG/DL (ref 65–99)
HCT VFR BLD AUTO: 41.8 % (ref 37–47)
HGB BLD-MCNC: 14.4 G/DL (ref 12–16)
IMM GRANULOCYTES # BLD: 0.01 10*3/MM3 (ref 0–0.03)
IMM GRANULOCYTES NFR BLD: 0.1 % (ref 0–0.5)
LIPASE SERPL-CCNC: 11 U/L (ref 13–60)
LYMPHOCYTES # BLD AUTO: 1.7 10*3/MM3 (ref 0.6–4.8)
LYMPHOCYTES NFR BLD AUTO: 23.9 % (ref 20–45)
MCH RBC QN AUTO: 31.2 PG (ref 27–31)
MCHC RBC AUTO-ENTMCNC: 34.4 G/DL (ref 31–37)
MCV RBC AUTO: 90.5 FL (ref 81–99)
MONOCYTES # BLD AUTO: 0.31 10*3/MM3 (ref 0–1)
MONOCYTES NFR BLD AUTO: 4.4 % (ref 3–8)
NEUTROPHILS # BLD AUTO: 4.98 10*3/MM3 (ref 1.5–8.3)
NEUTROPHILS NFR BLD AUTO: 70.2 % (ref 45–70)
NRBC BLD MANUAL-RTO: 0 /100 WBC (ref 0–0)
PHENYTOIN SERPL-MCNC: 10.2 MCG/ML (ref 10–20)
PLATELET # BLD AUTO: 250 10*3/MM3 (ref 140–500)
PMV BLD AUTO: 10.2 FL (ref 7.4–10.4)
POTASSIUM BLD-SCNC: 3.9 MMOL/L (ref 3.5–5.2)
PROT SERPL-MCNC: 6.9 G/DL (ref 6–8.5)
RBC # BLD AUTO: 4.62 10*6/MM3 (ref 4.2–5.4)
SODIUM BLD-SCNC: 139 MMOL/L (ref 136–145)
WBC NRBC COR # BLD: 7.1 10*3/MM3 (ref 4.8–10.8)

## 2018-01-23 PROCEDURE — 96374 THER/PROPH/DIAG INJ IV PUSH: CPT

## 2018-01-23 PROCEDURE — 99282 EMERGENCY DEPT VISIT SF MDM: CPT | Performed by: EMERGENCY MEDICINE

## 2018-01-23 PROCEDURE — 80053 COMPREHEN METABOLIC PANEL: CPT | Performed by: EMERGENCY MEDICINE

## 2018-01-23 PROCEDURE — 96361 HYDRATE IV INFUSION ADD-ON: CPT

## 2018-01-23 PROCEDURE — 99283 EMERGENCY DEPT VISIT LOW MDM: CPT

## 2018-01-23 PROCEDURE — 83690 ASSAY OF LIPASE: CPT | Performed by: EMERGENCY MEDICINE

## 2018-01-23 PROCEDURE — 80185 ASSAY OF PHENYTOIN TOTAL: CPT | Performed by: EMERGENCY MEDICINE

## 2018-01-23 PROCEDURE — 25010000002 ONDANSETRON PER 1 MG: Performed by: EMERGENCY MEDICINE

## 2018-01-23 PROCEDURE — 85025 COMPLETE CBC W/AUTO DIFF WBC: CPT | Performed by: EMERGENCY MEDICINE

## 2018-01-23 RX ORDER — ACETAMINOPHEN AND CODEINE PHOSPHATE 120; 12 MG/5ML; MG/5ML
1 SOLUTION ORAL DAILY
COMMUNITY
End: 2019-03-14

## 2018-01-23 RX ORDER — SODIUM CHLORIDE 0.9 % (FLUSH) 0.9 %
10 SYRINGE (ML) INJECTION AS NEEDED
Status: DISCONTINUED | OUTPATIENT
Start: 2018-01-23 | End: 2018-01-23 | Stop reason: HOSPADM

## 2018-01-23 RX ORDER — ONDANSETRON 2 MG/ML
4 INJECTION INTRAMUSCULAR; INTRAVENOUS ONCE
Status: COMPLETED | OUTPATIENT
Start: 2018-01-23 | End: 2018-01-23

## 2018-01-23 RX ORDER — ONDANSETRON 4 MG/1
4 TABLET, FILM COATED ORAL EVERY 4 HOURS PRN
Qty: 20 TABLET | Refills: 0 | Status: SHIPPED | OUTPATIENT
Start: 2018-01-23 | End: 2019-03-14

## 2018-01-23 RX ADMIN — ONDANSETRON 4 MG: 2 INJECTION, SOLUTION INTRAMUSCULAR; INTRAVENOUS at 16:03

## 2018-01-23 RX ADMIN — SODIUM CHLORIDE 1000 ML: 9 INJECTION, SOLUTION INTRAVENOUS at 17:45

## 2018-01-23 RX ADMIN — SODIUM CHLORIDE 1000 ML: 9 INJECTION, SOLUTION INTRAVENOUS at 16:03

## 2018-01-23 NOTE — ED PROVIDER NOTES
Subjective   History of Present Illness  History of Present Illness    Chief complaint: Nausea, lightheadedness, dry heaves, general malaise and incisional redness    Location: Abdominal redness    Quality/Severity:  Moderate    Timing/Duration: Patient had total laparoscopic hysterectomy on January 17 and was discharged the same day.  2 days ago the patient began to notice that she was feeling worse and had increased redness around her surgical incisions.    Modifying Factors: Surgery on January 17    Associated Symptoms: As noted above    Narrative: The patient is a 42-year-old white female who presents as noted above.  The patient relates that initially after surgery she was doing reasonably well and began to feel worse 2 days ago.  At that time she noticed that her incision did have increased redness and heat.  Patient has had some lightheadedness, poor oral intake, lightheadedness and dry heaves.  Denies fever.    Review of Systems   Constitutional: Positive for activity change ( decreased), appetite change (decreased) and fatigue. Negative for fever.   HENT: Negative for congestion.    Respiratory: Negative for cough, shortness of breath and wheezing.    Cardiovascular: Negative for chest pain, palpitations and leg swelling.   Gastrointestinal: Positive for nausea and vomiting (dry heaves only). Negative for abdominal pain and diarrhea.        Patient does relate that her bowel function is good.   Endocrine: Negative for polydipsia.   Genitourinary: Negative for difficulty urinating, dysuria, flank pain, frequency, urgency and vaginal bleeding.   Musculoskeletal: Negative for back pain.   Skin: Positive for wound (surgical with increased redness). Negative for rash.   Neurological: Negative for dizziness, weakness and headaches.   Psychiatric/Behavioral: Negative for confusion.   All other systems reviewed and are negative.      Past Medical History:   Diagnosis Date   • Chronic bronchitis    • Low blood  "potassium    • Seizures     last one month ago, states has \"light\" ones   • Sinus congestion    • Swelling     furosemide       Allergies   Allergen Reactions   • Morphine And Related    • Sulfa Antibiotics        Past Surgical History:   Procedure Laterality Date   • APPENDECTOMY     • FRACTURE SURGERY Right     hand   • MS LAP, RADICAL HYST W/ TUBE&OV, NODE BX Bilateral 1/17/2018    Procedure: TOTAL LAPAROSCOPIC HYSTERECTOMY, bilateral salpingectomy, IUD removal, and bilateral  ovarian cystoscopy;  Surgeon: Carmelita Palma DO;  Location: [facility];  Service: Obstetrics/Gynecology       Family History   Problem Relation Age of Onset   • Pulmonary embolism Mother    • Diabetes Father    • Diabetes Sister    • Diabetes Brother    • Cancer Maternal Aunt    • Dementia Maternal Grandmother    • Parkinsonism Maternal Grandfather        Social History     Social History   • Marital status: Single     Spouse name: N/A   • Number of children: N/A   • Years of education: N/A     Social History Main Topics   • Smoking status: Current Every Day Smoker     Packs/day: 0.50     Years: 25.00     Types: Cigarettes   • Smokeless tobacco: Never Used   • Alcohol use No   • Drug use: No   • Sexual activity: No     Other Topics Concern   • None     Social History Narrative           Objective   Physical Exam   Constitutional: She is oriented to person, place, and time. She appears well-developed and well-nourished.   The patient is an obese, 42-year-old, white female in no acute distress.   HENT:   Head: Normocephalic and atraumatic.   Eyes: Conjunctivae and EOM are normal.   Neck: Normal range of motion. Neck supple. No thyromegaly present.   Cardiovascular: Normal rate, regular rhythm and normal heart sounds.    No murmur heard.  Pulmonary/Chest: Effort normal and breath sounds normal. No respiratory distress. She has no wheezes. She has no rales.   Abdominal:   The anterior abdomen was pendulous with a significant pannus.  The " incision sites showed an expected amount of hyperemia without any overt signs of infection.  Mild diffuse abdominal tenderness.  The abdomen was soft with good bowel sounds present.  No rebound tenderness.   Musculoskeletal: Normal range of motion. She exhibits no edema or tenderness.   Lymphadenopathy:     She has no cervical adenopathy.   Neurological: She is alert and oriented to person, place, and time.   Patient was observed to ambulate slowly without evidence of ataxia.   Skin: Skin is warm and dry. No rash noted.   Psychiatric: She has a normal mood and affect. Her behavior is normal.   Nursing note and vitals reviewed.      Procedures         ED Course  ED Course   Comment By Time   01/23/18, 3:30 PM  As due to a busy emergency department the patient was initially seen at triage and orders entered. Devonte Mcgowan MD 01/23 1530   01/23/18, 4:43 PM  BUN/creatinine and hemoglobin all increased from last available laboratory findings.  This is indicative of hypovolemia/mild dehydration and fluid rate increased. Devonte Mcgowan MD 01/23 1644   01/23/18, 6:04 PM  Patient feeling subjectively better after IVF in the emergency department.  All laboratory findings discussed with the patient and she was encouraged to increase her fluid intake over the next 24 hours.  Patient was also advised to limit the use of her Percocet and increase her activities as tolerated. Devonte Mcgowan MD 01/23 0307                  MDM  Number of Diagnoses or Management Options  Fluid volume depletion: new and does not require workup     Amount and/or Complexity of Data Reviewed  Clinical lab tests: ordered and reviewed    Risk of Complications, Morbidity, and/or Mortality  Presenting problems: moderate  Diagnostic procedures: moderate  Management options: moderate      Labs this visit  Lab Results (last 24 hours)     Procedure Component Value Units Date/Time    CBC & Differential [[id number]] Collected:  01/23/18 2841     Specimen:  Blood Updated:  01/23/18 1603    Narrative:       The following orders were created for panel order CBC & Differential.  Procedure                               Abnormality         Status                     ---------                               -----------         ------                     CBC Auto Differential[[id number]]        Abnormal            Final result                 Please view results for these tests on the individual orders.    Comprehensive Metabolic Panel [[id number]]  (Abnormal) Collected:  01/23/18 1547    Specimen:  Blood Updated:  01/23/18 1621     Glucose 106 (H) mg/dL      BUN 21 (H) mg/dL      Creatinine 1.20 (H) mg/dL      Sodium 139 mmol/L      Potassium 3.9 mmol/L      Chloride 98 mmol/L      CO2 29.2 (H) mmol/L      Calcium 8.6 mg/dL      Total Protein 6.9 g/dL      Albumin 4.00 g/dL      ALT (SGPT) 6 U/L      AST (SGOT) 10 U/L      Alkaline Phosphatase 120 U/L      Total Bilirubin <0.2 (L) mg/dL      eGFR Non African Amer 49 (L) mL/min/1.73      Globulin 2.9 gm/dL      A/G Ratio 1.4 g/dL      BUN/Creatinine Ratio 17.5     Anion Gap 11.8 mmol/L     Lipase [[id number]]  (Abnormal) Collected:  01/23/18 1547    Specimen:  Blood Updated:  01/23/18 1617     Lipase 11 (L) U/L     Phenytoin Level, Total [[id number]]  (Normal) Collected:  01/23/18 1547    Specimen:  Blood Updated:  01/23/18 1651     Phenytoin Level 10.2 mcg/mL     CBC Auto Differential [[id number]]  (Abnormal) Collected:  01/23/18 1547    Specimen:  Blood Updated:  01/23/18 1603     WBC 7.10 10*3/mm3      RBC 4.62 10*6/mm3      Hemoglobin 14.4 g/dL      Hematocrit 41.8 %      MCV 90.5 fL      MCH 31.2 (H) pg      MCHC 34.4 g/dL      RDW 13.8 %      RDW-SD 45.5 fl      MPV 10.2 fL      Platelets 250 10*3/mm3      Neutrophil % 70.2 (H) %      Lymphocyte % 23.9 %      Monocyte % 4.4 %      Eosinophil % 1.0 %      Basophil % 0.4 %      Immature Grans % 0.1 %      Neutrophils, Absolute 4.98 10*3/mm3      Lymphocytes, Absolute  1.70 10*3/mm3      Monocytes, Absolute 0.31 10*3/mm3      Eosinophils, Absolute 0.07 (L) 10*3/mm3      Basophils, Absolute 0.03 10*3/mm3      Immature Grans, Absolute 0.01 10*3/mm3      nRBC 0.0 /100 WBC         Prescribed on discharge             Medication List      New Prescriptions          ondansetron 4 MG tablet   Commonly known as:  ZOFRAN   Take 1 tablet by mouth Every 4 (Four) Hours As Needed for Nausea or   Vomiting.         Stop          busPIRone 7.5 MG tablet   Commonly known as:  BUSPAR       CLARITIN 10 MG capsule   Generic drug:  Loratadine       docusate sodium 100 MG capsule       levonorgestrel 20 MCG/24HR IUD   Commonly known as:  MIRENA       polyethylene glycol packet   Commonly known as:  MIRALAX           All lab results, imaging results and other tests were reviewed by Devonte Mcgowan MD and unless otherwise specified were found to be unremarkable.      Final diagnoses:   Fluid volume depletion            Devonte Mcgowan MD  01/23/18 6909

## 2018-01-23 NOTE — DISCHARGE INSTRUCTIONS
Try not to take the Percocet and unless she absolutely has to as this may contribute to your nausea.  Increase your fluid intake over the next 24 hours.

## 2018-01-30 ENCOUNTER — OFFICE VISIT (OUTPATIENT)
Dept: OBSTETRICS AND GYNECOLOGY | Facility: CLINIC | Age: 43
End: 2018-01-30

## 2018-01-30 VITALS
HEIGHT: 69 IN | WEIGHT: 252 LBS | SYSTOLIC BLOOD PRESSURE: 120 MMHG | DIASTOLIC BLOOD PRESSURE: 80 MMHG | BODY MASS INDEX: 37.33 KG/M2

## 2018-01-30 DIAGNOSIS — Z98.890 POST-OPERATIVE STATE: Primary | ICD-10-CM

## 2018-01-30 PROCEDURE — 99024 POSTOP FOLLOW-UP VISIT: CPT | Performed by: OBSTETRICS & GYNECOLOGY

## 2018-01-30 NOTE — PROGRESS NOTES
"Post op VISIT    Chief Complaint: post op      Kayli Calles is a 42 y.o. patient who presents for follow up after TLH/BS. Pt is doing very well. Pt is rarely taking any pain pills anymore. No VB. No constipation. Not eating too much food. +flatus. Seen in the ER 1/23/18 for dehydration and given IV fluids and discharged home.  Chief Complaint   Patient presents with   • Post-op             The following portions of the patient's history were reviewed and updated as appropriate: allergies, current medications and problem list.    Review of Systems   Gastrointestinal: Negative for constipation, diarrhea, nausea and vomiting.   Genitourinary: Negative for pelvic pain and vaginal bleeding.       /80  Ht 175.3 cm (69\")  Wt 114 kg (252 lb)  LMP 01/15/2018  BMI 37.21 kg/m2    Physical Exam   Constitutional: She is oriented to person, place, and time. She appears well-developed and well-nourished. No distress.   Abdominal: Soft. She exhibits no distension and no mass. There is no tenderness.   Incisions well healing. C/D/I.   Neurological: She is alert and oriented to person, place, and time.   Skin: She is not diaphoretic.   Psychiatric: She has a normal mood and affect. Her behavior is normal. Judgment and thought content normal.   Vitals reviewed.        Assessment/Plan   Kayli was seen today for post-op.    Diagnoses and all orders for this visit:    Post-operative state      41yo s/p TLH/BS due to menometrorrhagia    POD# 1/17/18  Path benign.  Continue precautions. No heavy lifting.             Return in about 4 weeks (around 2/27/2018).      Carmelita Palma, DO    1/30/2018  1:19 PM  "

## 2018-03-01 ENCOUNTER — OFFICE VISIT (OUTPATIENT)
Dept: OBSTETRICS AND GYNECOLOGY | Facility: CLINIC | Age: 43
End: 2018-03-01

## 2018-03-01 VITALS
WEIGHT: 251.1 LBS | HEIGHT: 69 IN | SYSTOLIC BLOOD PRESSURE: 124 MMHG | BODY MASS INDEX: 37.19 KG/M2 | DIASTOLIC BLOOD PRESSURE: 77 MMHG

## 2018-03-01 DIAGNOSIS — Z13.9 SCREENING FOR CONDITION: ICD-10-CM

## 2018-03-01 DIAGNOSIS — Z98.890 POST-OPERATIVE STATE: Primary | ICD-10-CM

## 2018-03-01 LAB
BILIRUB BLD-MCNC: NEGATIVE MG/DL
CLARITY, POC: CLEAR
COLOR UR: YELLOW
GLUCOSE UR STRIP-MCNC: NEGATIVE MG/DL
KETONES UR QL: NEGATIVE
LEUKOCYTE EST, POC: NEGATIVE
NITRITE UR-MCNC: NEGATIVE MG/ML
PH UR: 5 [PH] (ref 5–8)
PROT UR STRIP-MCNC: NEGATIVE MG/DL
RBC # UR STRIP: NEGATIVE /UL
SP GR UR: 1 (ref 1–1.03)
UROBILINOGEN UR QL: NORMAL

## 2018-03-01 PROCEDURE — 81002 URINALYSIS NONAUTO W/O SCOPE: CPT | Performed by: OBSTETRICS & GYNECOLOGY

## 2018-03-01 PROCEDURE — 99024 POSTOP FOLLOW-UP VISIT: CPT | Performed by: OBSTETRICS & GYNECOLOGY

## 2018-03-01 NOTE — PROGRESS NOTES
"POST OP VISIT    Chief Complaint: post op      Kayli Calles is a 43 y.o. patient who presents for follow up after TLH/BS on 1/17/2018. Pt is doing well. No VB. No problems with BM or urinating. Tolerating a general diet. Off pain meds.  Chief Complaint   Patient presents with   • Post-op             The following portions of the patient's history were reviewed and updated as appropriate: allergies, current medications and problem list.    Review of Systems   Gastrointestinal: Negative for abdominal distention, abdominal pain and constipation.   Genitourinary: Negative for pelvic pain, vaginal bleeding, vaginal discharge and vaginal pain.       /77  Ht 175.3 cm (69.02\")  Wt 114 kg (251 lb 1.6 oz)  LMP 01/15/2018  BMI 37.06 kg/m2    Physical Exam   Constitutional: She is oriented to person, place, and time. She appears well-developed and well-nourished. No distress.   Abdominal: Soft. There is no tenderness.   Abdominal incisions C/D/I.   Genitourinary: There is no rash, tenderness, lesion or injury on the right labia. There is no rash, tenderness, lesion or injury on the left labia.   Genitourinary Comments: Vaginal cuff well healing. No VB.   Neurological: She is alert and oriented to person, place, and time.   Skin: She is not diaphoretic.   Psychiatric: She has a normal mood and affect. Her behavior is normal. Judgment and thought content normal.   Vitals reviewed.        Assessment/Plan   Kayli was seen today for post-op.    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 No further restrictions.     2) Gyn HM: LPS and MMG 5/2017. Pt is not sexually active.               Return in about 1 year (around 3/1/2019) for Annual physical.      Carmelita Palma,     3/1/2018  1:28 PM  "

## 2019-03-14 ENCOUNTER — OFFICE VISIT (OUTPATIENT)
Dept: OBSTETRICS AND GYNECOLOGY | Facility: CLINIC | Age: 44
End: 2019-03-14

## 2019-03-14 VITALS
BODY MASS INDEX: 38.51 KG/M2 | WEIGHT: 260 LBS | HEIGHT: 69 IN | DIASTOLIC BLOOD PRESSURE: 62 MMHG | SYSTOLIC BLOOD PRESSURE: 100 MMHG

## 2019-03-14 DIAGNOSIS — Z12.31 ENCOUNTER FOR SCREENING MAMMOGRAM FOR MALIGNANT NEOPLASM OF BREAST: ICD-10-CM

## 2019-03-14 DIAGNOSIS — Z01.419 WELL FEMALE EXAM WITH ROUTINE GYNECOLOGICAL EXAM: Primary | ICD-10-CM

## 2019-03-14 DIAGNOSIS — Z11.51 ENCOUNTER FOR SCREENING FOR HUMAN PAPILLOMAVIRUS (HPV): ICD-10-CM

## 2019-03-14 PROCEDURE — G0101 CA SCREEN;PELVIC/BREAST EXAM: HCPCS | Performed by: OBSTETRICS & GYNECOLOGY

## 2019-03-14 NOTE — PROGRESS NOTES
"GYN Annual Exam     CC- Here for annual exam.     Kayli Calles is a 44 y.o. female who presents for annual well woman exam. Cycles are absent since TLH/BS 2018 for menometrorrhagia. Pt is not sexually active. No vasomotors. No HRT.    OB History      Para Term  AB Living        0          SAB TAB Ectopic Molar Multiple Live Births                         Current contraception: status post hysterectomy  History of abnormal Pap smear: no  History of abnormal mammogram: no  Family history of uterine, colon or ovarian cancer: no  Family history of breast cancer: yes - paternal aunts    Health Maintenance   Topic Date Due   • PNEUMOCOCCAL VACCINE (19-64 MEDIUM RISK) (1 of 1 - PPSV23) 02/15/1992   • TDAP/TD VACCINES (1 - Tdap) 02/15/1992   • MEDICARE ANNUAL WELLNESS  2017   • PAP SMEAR  2020   • INFLUENZA VACCINE  Completed       Past Medical History:   Diagnosis Date   • Chronic bronchitis (CMS/HCC)    • Low blood potassium    • Seizures (CMS/HCC)     last one month ago, states has \"light\" ones   • Sinus congestion    • Swelling     furosemide       Past Surgical History:   Procedure Laterality Date   • APPENDECTOMY     • FRACTURE SURGERY Right     hand   • HYSTERECTOMY     • CT LAP, RADICAL HYST W/ TUBE&OV, NODE BX Bilateral 2018    Procedure: TOTAL LAPAROSCOPIC HYSTERECTOMY, bilateral salpingectomy, IUD removal, and bilateral  ovarian cystoscopy;  Surgeon: Carmelita Palma DO;  Location: [facility];  Service: Obstetrics/Gynecology         Current Outpatient Medications:   •  busPIRone (BUSPAR) 7.5 MG tablet, Take 7.5 mg by mouth 2 (Two) Times a Day., Disp: , Rfl:   •  cetirizine (zyrTEC) 10 MG tablet, Take 10 mg by mouth Daily., Disp: , Rfl:   •  fluticasone (FLONASE) 50 MCG/ACT nasal spray, 2 sprays into each nostril Daily., Disp: , Rfl:   •  furosemide (LASIX) 40 MG tablet, Take 120 mg by mouth Daily., Disp: , Rfl:   •  ibuprofen (ADVIL,MOTRIN) 800 MG tablet, Take 1 tablet by mouth " "Every 8 (Eight) Hours., Disp: 30 tablet, Rfl: 0  •  Loratadine (CLARITIN) 10 MG capsule, Take 1 tablet by mouth Daily., Disp: , Rfl:   •  montelukast (SINGULAIR) 10 MG tablet, Take 10 mg by mouth Daily., Disp: , Rfl:   •  norethindrone (SHAROBEL) 0.35 MG tablet, Take 1 tablet by mouth Daily., Disp: , Rfl:   •  ondansetron (ZOFRAN) 4 MG tablet, Take 1 tablet by mouth Every 4 (Four) Hours As Needed for Nausea or Vomiting., Disp: 20 tablet, Rfl: 0  •  phenytoin (DILANTIN) 100 MG ER capsule, Take 400 mg by mouth 2 (Two) Times a Day., Disp: , Rfl:   •  potassium chloride (K-DUR) 10 MEQ CR tablet, Take 10 mEq by mouth 2 (Two) Times a Day., Disp: , Rfl:   •  vitamin D (ERGOCALCIFEROL) 40101 units capsule capsule, Take 50,000 Units by mouth Every 7 (Seven) Days., Disp: , Rfl: 0    Allergies   Allergen Reactions   • Morphine And Related GI Intolerance   • Sulfa Antibiotics GI Intolerance       Social History     Tobacco Use   • Smoking status: Current Every Day Smoker     Packs/day: 0.50     Years: 25.00     Pack years: 12.50     Types: Cigarettes   • Smokeless tobacco: Never Used   Substance Use Topics   • Alcohol use: No   • Drug use: No       Family History   Problem Relation Age of Onset   • Pulmonary embolism Mother    • Rheum arthritis Mother    • Diabetes Father    • Melanoma Father    • Diabetes Sister    • Diabetes Brother    • Cancer Maternal Aunt    • Colon cancer Maternal Aunt    • Dementia Maternal Grandmother    • Parkinsonism Maternal Grandfather        Review of Systems   Gastrointestinal: Negative for abdominal distention, abdominal pain, anal bleeding, blood in stool, constipation, diarrhea, nausea and vomiting.   Genitourinary: Negative for pelvic pain, vaginal bleeding, vaginal discharge and vaginal pain.       /62   Ht 175.3 cm (69.02\")   Wt 118 kg (260 lb)   LMP 01/15/2018   BMI 38.38 kg/m²     Physical Exam   Constitutional: She is oriented to person, place, and time. She appears " well-developed and well-nourished.   HENT:   Mouth/Throat: Normal dentition. No dental caries.   Cardiovascular: Normal rate and regular rhythm.   Pulmonary/Chest: Effort normal and breath sounds normal. Right breast exhibits no inverted nipple, no mass, no nipple discharge, no skin change and no tenderness. Left breast exhibits no inverted nipple, no mass, no nipple discharge, no skin change and no tenderness.   Abdominal: Soft. She exhibits no distension and no mass. There is no tenderness.   Genitourinary: There is no rash, tenderness or lesion on the right labia. There is no rash, tenderness or lesion on the left labia. Right adnexum displays no mass, no tenderness and no fullness. Left adnexum displays no mass, no tenderness and no fullness. No tenderness or bleeding in the vagina. No vaginal discharge found.   Neurological: She is alert and oriented to person, place, and time.   Skin:   LE swelling and discoloration- consistent with venous stasis   Psychiatric: She has a normal mood and affect. Her behavior is normal. Judgment and thought content normal.   Vitals reviewed.         Assessment/Plan    1) GYN HM: Check pap of vaginal cuff. Check MMG.  SBE demonstrated and encouraged.  2) STD screening: not sexually active  3) Seizure disorder: pt is on Dilantin. Will need an earlier bone density.  4) Family Planning: s/p TLH. G0.  5) Diet and Exercise discussed  6) Smoking Status: smoker  7) Social: Lives with her sister for last year after her mother .  8) Follow up prn and 1 year       Diagnoses and all orders for this visit:    Well female exam with routine gynecological exam  -     Pap IG (Image Guided)    Encounter for screening for human papillomavirus (HPV)  -     Pap IG (Image Guided)          Carmelita Palma DO  3/14/2019  10:18 AM

## 2019-03-19 LAB
CYTOLOGIST CVX/VAG CYTO: NORMAL
CYTOLOGY CVX/VAG DOC THIN PREP: NORMAL
DX ICD CODE: NORMAL
HIV 1 & 2 AB SER-IMP: NORMAL
Lab: NORMAL
OTHER STN SPEC: NORMAL
PATH REPORT.FINAL DX SPEC: NORMAL
STAT OF ADQ CVX/VAG CYTO-IMP: NORMAL

## 2019-12-13 ENCOUNTER — TRANSCRIBE ORDERS (OUTPATIENT)
Dept: ADMINISTRATIVE | Facility: HOSPITAL | Age: 44
End: 2019-12-13

## 2019-12-13 DIAGNOSIS — G40.909 SEIZURE DISORDER (HCC): Primary | ICD-10-CM

## 2019-12-19 ENCOUNTER — HOSPITAL ENCOUNTER (OUTPATIENT)
Dept: PULMONOLOGY | Facility: HOSPITAL | Age: 44
Discharge: HOME | End: 2019-12-19
Admitting: NURSE PRACTITIONER

## 2019-12-19 DIAGNOSIS — G40.909 SEIZURE DISORDER (HCC): ICD-10-CM

## 2019-12-19 PROCEDURE — 95816 EEG AWAKE AND DROWSY: CPT

## 2019-12-19 PROCEDURE — 95816 EEG AWAKE AND DROWSY: CPT | Performed by: PSYCHIATRY & NEUROLOGY

## 2023-06-14 ENCOUNTER — TRANSCRIBE ORDERS (OUTPATIENT)
Dept: ADMINISTRATIVE | Facility: HOSPITAL | Age: 48
End: 2023-06-14
Payer: MEDICARE

## 2023-06-14 DIAGNOSIS — M81.8 OTHER OSTEOPOROSIS, UNSPECIFIED PATHOLOGICAL FRACTURE PRESENCE: Primary | ICD-10-CM

## (undated) DEVICE — GLV SURG SENSICARE LT W/ALOE PF LF 7 STRL

## (undated) DEVICE — APPL CHLORAPREP W/TINT 26ML ORNG

## (undated) DEVICE — IRRIGATOR BULB ASEPTO 60CC STRL

## (undated) DEVICE — PAD SANI MAXI W/ADHS SNG WRP 11IN

## (undated) DEVICE — GLV SURG SENSICARE MICRO PF LF 6 STRL

## (undated) DEVICE — UNDYED BRAIDED (POLYGLACTIN 910), SYNTHETIC ABSORBABLE SUTURE: Brand: COATED VICRYL

## (undated) DEVICE — GOWN,PREVENTION PLUS,XXLARGE,STERILE: Brand: MEDLINE

## (undated) DEVICE — OCCL COLPO PNEUMO  STRL

## (undated) DEVICE — SAFESECURE,SECUREMENT,FOLEY CATH,STERILE: Brand: MEDLINE

## (undated) DEVICE — BNDG ADHS FABRC 1X3IN

## (undated) DEVICE — TOTAL TRAY, DB, 100% SILI FOLEY, 16FR 10: Brand: MEDLINE

## (undated) DEVICE — MANIP UTER RUMI TP 6.7MM 6CM WHT

## (undated) DEVICE — MANIP UTER RUMI TP 6.7MM 10CM GRN

## (undated) DEVICE — GLV SURG SENSICARE MICRO PF LF 7.5 STRL

## (undated) DEVICE — DRP Z/FRICTION 10X16IN

## (undated) DEVICE — CVR HNDL LT SURG ACCSSRY BLU STRL

## (undated) DEVICE — BNDG ADHS CURAD FLX/FABRC 2X4IN STRL LF

## (undated) DEVICE — PK TLH 90

## (undated) DEVICE — TOWEL,OR,DSP,ST,BLUE,STD,4/PK,20PK/CS: Brand: MEDLINE

## (undated) DEVICE — TBG INSUFL W FLTR STRL